# Patient Record
Sex: FEMALE | URBAN - METROPOLITAN AREA
[De-identification: names, ages, dates, MRNs, and addresses within clinical notes are randomized per-mention and may not be internally consistent; named-entity substitution may affect disease eponyms.]

---

## 2018-11-13 ENCOUNTER — APPOINTMENT (OUTPATIENT)
Dept: GENERAL RADIOLOGY | Facility: CLINIC | Age: 63
End: 2018-11-13
Attending: EMERGENCY MEDICINE
Payer: MEDICARE

## 2018-11-13 ENCOUNTER — HOSPITAL ENCOUNTER (OUTPATIENT)
Facility: CLINIC | Age: 63
Setting detail: OBSERVATION
Discharge: HOME | End: 2018-11-15
Attending: EMERGENCY MEDICINE | Admitting: INTERNAL MEDICINE
Payer: MEDICARE

## 2018-11-13 DIAGNOSIS — F32.9 MAJOR DEPRESSIVE DISORDER, REMISSION STATUS UNSPECIFIED, UNSPECIFIED WHETHER RECURRENT: Primary | ICD-10-CM

## 2018-11-13 DIAGNOSIS — K21.9 GASTROESOPHAGEAL REFLUX DISEASE, ESOPHAGITIS PRESENCE NOT SPECIFIED: ICD-10-CM

## 2018-11-13 DIAGNOSIS — Z79.4 TYPE 2 DIABETES MELLITUS WITH COMPLICATION, WITH LONG-TERM CURRENT USE OF INSULIN (H): ICD-10-CM

## 2018-11-13 DIAGNOSIS — R41.89 COGNITIVE IMPAIRMENT: ICD-10-CM

## 2018-11-13 DIAGNOSIS — R73.9 HYPERGLYCEMIA: ICD-10-CM

## 2018-11-13 DIAGNOSIS — E11.8 TYPE 2 DIABETES MELLITUS WITH COMPLICATION, WITH LONG-TERM CURRENT USE OF INSULIN (H): ICD-10-CM

## 2018-11-13 LAB
ALBUMIN SERPL-MCNC: 2.8 G/DL (ref 3.4–5)
ALP SERPL-CCNC: 154 U/L (ref 40–150)
ALT SERPL W P-5'-P-CCNC: 36 U/L (ref 0–50)
ANION GAP SERPL CALCULATED.3IONS-SCNC: 10 MMOL/L (ref 3–14)
AST SERPL W P-5'-P-CCNC: 9 U/L (ref 0–45)
BASOPHILS # BLD AUTO: 0 10E9/L (ref 0–0.2)
BASOPHILS NFR BLD AUTO: 0.2 %
BILIRUB SERPL-MCNC: 0.5 MG/DL (ref 0.2–1.3)
BUN SERPL-MCNC: 42 MG/DL (ref 7–30)
CALCIUM SERPL-MCNC: 10.2 MG/DL (ref 8.5–10.1)
CHLORIDE SERPL-SCNC: 91 MMOL/L (ref 94–109)
CO2 SERPL-SCNC: 27 MMOL/L (ref 20–32)
CREAT SERPL-MCNC: 1.04 MG/DL (ref 0.52–1.04)
DIFFERENTIAL METHOD BLD: ABNORMAL
EOSINOPHIL # BLD AUTO: 0.2 10E9/L (ref 0–0.7)
EOSINOPHIL NFR BLD AUTO: 1.9 %
ERYTHROCYTE [DISTWIDTH] IN BLOOD BY AUTOMATED COUNT: 12.6 % (ref 10–15)
GFR SERPL CREATININE-BSD FRML MDRD: 53 ML/MIN/1.7M2
GLUCOSE BLDC GLUCOMTR-MCNC: 488 MG/DL (ref 70–99)
GLUCOSE BLDC GLUCOMTR-MCNC: >600 MG/DL (ref 70–99)
GLUCOSE SERPL-MCNC: 715 MG/DL (ref 70–99)
HBA1C MFR BLD: 8.1 % (ref 0–5.6)
HCT VFR BLD AUTO: 37.4 % (ref 35–47)
HGB BLD-MCNC: 12.1 G/DL (ref 11.7–15.7)
IMM GRANULOCYTES # BLD: 0.1 10E9/L (ref 0–0.4)
IMM GRANULOCYTES NFR BLD: 0.5 %
INTERPRETATION ECG - MUSE: NORMAL
INTERPRETATION ECG - MUSE: NORMAL
LIPASE SERPL-CCNC: 57 U/L (ref 73–393)
LYMPHOCYTES # BLD AUTO: 2 10E9/L (ref 0.8–5.3)
LYMPHOCYTES NFR BLD AUTO: 17.7 %
MCH RBC QN AUTO: 27.9 PG (ref 26.5–33)
MCHC RBC AUTO-ENTMCNC: 32.4 G/DL (ref 31.5–36.5)
MCV RBC AUTO: 86 FL (ref 78–100)
MONOCYTES # BLD AUTO: 0.7 10E9/L (ref 0–1.3)
MONOCYTES NFR BLD AUTO: 6 %
NEUTROPHILS # BLD AUTO: 8.2 10E9/L (ref 1.6–8.3)
NEUTROPHILS NFR BLD AUTO: 73.7 %
NRBC # BLD AUTO: 0 10*3/UL
NRBC BLD AUTO-RTO: 0 /100
PLATELET # BLD AUTO: 280 10E9/L (ref 150–450)
POTASSIUM SERPL-SCNC: 4.6 MMOL/L (ref 3.4–5.3)
PROT SERPL-MCNC: 7.8 G/DL (ref 6.8–8.8)
RBC # BLD AUTO: 4.34 10E12/L (ref 3.8–5.2)
SODIUM SERPL-SCNC: 128 MMOL/L (ref 133–144)
TROPONIN I BLD-MCNC: 0.01 UG/L (ref 0–0.08)
TROPONIN I SERPL-MCNC: <0.015 UG/L (ref 0–0.04)
WBC # BLD AUTO: 11.1 10E9/L (ref 4–11)

## 2018-11-13 PROCEDURE — 25000131 ZZH RX MED GY IP 250 OP 636 PS 637: Mod: GY | Performed by: INTERNAL MEDICINE

## 2018-11-13 PROCEDURE — 96372 THER/PROPH/DIAG INJ SC/IM: CPT | Mod: XS

## 2018-11-13 PROCEDURE — 25000125 ZZHC RX 250: Performed by: EMERGENCY MEDICINE

## 2018-11-13 PROCEDURE — 93005 ELECTROCARDIOGRAM TRACING: CPT | Mod: 76

## 2018-11-13 PROCEDURE — 25000128 H RX IP 250 OP 636: Performed by: INTERNAL MEDICINE

## 2018-11-13 PROCEDURE — 84484 ASSAY OF TROPONIN QUANT: CPT | Performed by: EMERGENCY MEDICINE

## 2018-11-13 PROCEDURE — 25000132 ZZH RX MED GY IP 250 OP 250 PS 637: Performed by: EMERGENCY MEDICINE

## 2018-11-13 PROCEDURE — 25000131 ZZH RX MED GY IP 250 OP 636 PS 637: Mod: GY | Performed by: EMERGENCY MEDICINE

## 2018-11-13 PROCEDURE — 85025 COMPLETE CBC W/AUTO DIFF WBC: CPT | Performed by: EMERGENCY MEDICINE

## 2018-11-13 PROCEDURE — 84484 ASSAY OF TROPONIN QUANT: CPT

## 2018-11-13 PROCEDURE — 36415 COLL VENOUS BLD VENIPUNCTURE: CPT | Performed by: INTERNAL MEDICINE

## 2018-11-13 PROCEDURE — 96374 THER/PROPH/DIAG INJ IV PUSH: CPT

## 2018-11-13 PROCEDURE — 25000132 ZZH RX MED GY IP 250 OP 250 PS 637: Mod: GY | Performed by: INTERNAL MEDICINE

## 2018-11-13 PROCEDURE — 84484 ASSAY OF TROPONIN QUANT: CPT | Performed by: INTERNAL MEDICINE

## 2018-11-13 PROCEDURE — 93005 ELECTROCARDIOGRAM TRACING: CPT

## 2018-11-13 PROCEDURE — A9270 NON-COVERED ITEM OR SERVICE: HCPCS | Mod: GY | Performed by: INTERNAL MEDICINE

## 2018-11-13 PROCEDURE — 99220 ZZC INITIAL OBSERVATION CARE,LEVL III: CPT | Performed by: INTERNAL MEDICINE

## 2018-11-13 PROCEDURE — 99285 EMERGENCY DEPT VISIT HI MDM: CPT | Mod: 25

## 2018-11-13 PROCEDURE — 80053 COMPREHEN METABOLIC PANEL: CPT | Performed by: EMERGENCY MEDICINE

## 2018-11-13 PROCEDURE — 83036 HEMOGLOBIN GLYCOSYLATED A1C: CPT | Performed by: INTERNAL MEDICINE

## 2018-11-13 PROCEDURE — 25000128 H RX IP 250 OP 636: Performed by: EMERGENCY MEDICINE

## 2018-11-13 PROCEDURE — 71046 X-RAY EXAM CHEST 2 VIEWS: CPT

## 2018-11-13 PROCEDURE — A9270 NON-COVERED ITEM OR SERVICE: HCPCS | Mod: GY | Performed by: EMERGENCY MEDICINE

## 2018-11-13 PROCEDURE — G0378 HOSPITAL OBSERVATION PER HR: HCPCS

## 2018-11-13 PROCEDURE — 83690 ASSAY OF LIPASE: CPT | Performed by: EMERGENCY MEDICINE

## 2018-11-13 PROCEDURE — 96361 HYDRATE IV INFUSION ADD-ON: CPT

## 2018-11-13 PROCEDURE — 00000146 ZZHCL STATISTIC GLUCOSE BY METER IP

## 2018-11-13 RX ORDER — ONDANSETRON 4 MG/1
4 TABLET, ORALLY DISINTEGRATING ORAL EVERY 6 HOURS PRN
Status: DISCONTINUED | OUTPATIENT
Start: 2018-11-13 | End: 2018-11-15 | Stop reason: HOSPADM

## 2018-11-13 RX ORDER — DEXTROSE MONOHYDRATE 25 G/50ML
25-50 INJECTION, SOLUTION INTRAVENOUS
Status: DISCONTINUED | OUTPATIENT
Start: 2018-11-13 | End: 2018-11-14

## 2018-11-13 RX ORDER — AMOXICILLIN 250 MG
2 CAPSULE ORAL 2 TIMES DAILY PRN
Status: DISCONTINUED | OUTPATIENT
Start: 2018-11-13 | End: 2018-11-15 | Stop reason: HOSPADM

## 2018-11-13 RX ORDER — ACETAMINOPHEN 325 MG/1
650 TABLET ORAL EVERY 4 HOURS PRN
Status: DISCONTINUED | OUTPATIENT
Start: 2018-11-13 | End: 2018-11-15 | Stop reason: HOSPADM

## 2018-11-13 RX ORDER — ONDANSETRON 2 MG/ML
4 INJECTION INTRAMUSCULAR; INTRAVENOUS EVERY 6 HOURS PRN
Status: DISCONTINUED | OUTPATIENT
Start: 2018-11-13 | End: 2018-11-15 | Stop reason: HOSPADM

## 2018-11-13 RX ORDER — NALOXONE HYDROCHLORIDE 0.4 MG/ML
.1-.4 INJECTION, SOLUTION INTRAMUSCULAR; INTRAVENOUS; SUBCUTANEOUS
Status: DISCONTINUED | OUTPATIENT
Start: 2018-11-13 | End: 2018-11-15 | Stop reason: HOSPADM

## 2018-11-13 RX ORDER — ONDANSETRON 2 MG/ML
4 INJECTION INTRAMUSCULAR; INTRAVENOUS EVERY 30 MIN PRN
Status: DISCONTINUED | OUTPATIENT
Start: 2018-11-13 | End: 2018-11-13

## 2018-11-13 RX ORDER — METOPROLOL SUCCINATE 100 MG/1
100 TABLET, EXTENDED RELEASE ORAL DAILY
Status: DISCONTINUED | OUTPATIENT
Start: 2018-11-13 | End: 2018-11-15 | Stop reason: HOSPADM

## 2018-11-13 RX ORDER — NICOTINE POLACRILEX 4 MG
15-30 LOZENGE BUCCAL
Status: DISCONTINUED | OUTPATIENT
Start: 2018-11-13 | End: 2018-11-14

## 2018-11-13 RX ORDER — NITROGLYCERIN 0.4 MG/1
0.4 TABLET SUBLINGUAL ONCE
Status: COMPLETED | OUTPATIENT
Start: 2018-11-13 | End: 2018-11-13

## 2018-11-13 RX ORDER — AMOXICILLIN 250 MG
1 CAPSULE ORAL 2 TIMES DAILY PRN
Status: DISCONTINUED | OUTPATIENT
Start: 2018-11-13 | End: 2018-11-15 | Stop reason: HOSPADM

## 2018-11-13 RX ORDER — SODIUM CHLORIDE 9 MG/ML
INJECTION, SOLUTION INTRAVENOUS CONTINUOUS
Status: DISCONTINUED | OUTPATIENT
Start: 2018-11-13 | End: 2018-11-14

## 2018-11-13 RX ORDER — ACETAMINOPHEN 650 MG/1
650 SUPPOSITORY RECTAL EVERY 4 HOURS PRN
Status: DISCONTINUED | OUTPATIENT
Start: 2018-11-13 | End: 2018-11-15 | Stop reason: HOSPADM

## 2018-11-13 RX ADMIN — SODIUM CHLORIDE: 9 INJECTION, SOLUTION INTRAVENOUS at 16:29

## 2018-11-13 RX ADMIN — ACETAMINOPHEN 650 MG: 325 TABLET, FILM COATED ORAL at 20:15

## 2018-11-13 RX ADMIN — NITROGLYCERIN 0.4 MG: 0.4 TABLET SUBLINGUAL at 14:41

## 2018-11-13 RX ADMIN — ONDANSETRON 4 MG: 2 INJECTION INTRAMUSCULAR; INTRAVENOUS at 14:33

## 2018-11-13 RX ADMIN — INSULIN GLARGINE 40 UNITS: 100 INJECTION, SOLUTION SUBCUTANEOUS at 15:49

## 2018-11-13 RX ADMIN — INSULIN ASPART 7 UNITS: 100 INJECTION, SOLUTION INTRAVENOUS; SUBCUTANEOUS at 17:25

## 2018-11-13 RX ADMIN — SODIUM CHLORIDE 1000 ML: 9 INJECTION, SOLUTION INTRAVENOUS at 15:07

## 2018-11-13 RX ADMIN — METOPROLOL SUCCINATE 100 MG: 100 TABLET, EXTENDED RELEASE ORAL at 17:09

## 2018-11-13 RX ADMIN — LIDOCAINE HYDROCHLORIDE 30 ML: 20 SOLUTION ORAL; TOPICAL at 14:34

## 2018-11-13 NOTE — PROGRESS NOTES
RECEIVING UNIT ED HANDOFF REVIEW    ED Nurse Handoff Report was reviewed by: Komal Gerber on November 13, 2018 at 4:21 PM

## 2018-11-13 NOTE — IP AVS SNAPSHOT
MRN:[id number]                      After Visit Summary   11/13/2018    Kadi Leung    MRN: [id number]           Thank you!     Thank you for choosing [facility] for your care. Our goal is always to provide you with excellent care. Hearing back from our patients is one way we can continue to improve our services. Please take a few minutes to complete the written survey that you may receive in the mail after you visit with us. Thank you!        Patient Information     Date Of Birth          1955        About your hospital stay     You were admitted on:  November 13, 2018 You last received care in the:  [facility] Observation Unit    You were discharged on:  November 15, 2018        Reason for your hospital stay       You were here for evaluation of high blood sugars and confusion                  Who to Call     For medical emergencies, please call 911.  For non-urgent questions about your medical care, please call your primary care provider or clinic, [phone]          Attending Provider     Provider Specialty    Urvashi Ibarra MD Emergency Medicine    Mariscal, Gagandeep Bennett MD Internal Medicine       Primary Care Provider Office Phone # Fax #    [facility] [phone]      After Care Instructions     Activity       Your activity upon discharge: activity as tolerated            Diet       Follow this diet upon discharge: Orders Placed This Encounter      Moderate Consistent CHO Diet            Discharge Instructions       We have made some changes in your medications. Because you have been off of these for several weeks we need to adjust some of your doses.   tonight (11/15) when you get home do not take any of your evening pills (nortryptaline or metformin).  A nurse will be out to your home tomorrow morning to help change out the pills in your pillbox and make sure all your new doses are correct. DO NOT TAKE your pills tomorrow morning 11/16 until  the nurse comes out and helps. You can take your insulin as you normally do.     Changes:  Reduce metformin to 1000mg in the morning and 500mg in the evening. You will do this for 2 weeks and then resume the dose you have previously been on (1500mg in the morning, 100mg with dinner)    Reduce your Nortryptyline that you take at bedtime to 10mg (it was 50mg before). Ask your psychiatry when to increase your dose back to normal when you see them in clinic.   Reduce you Lexapro to 10mg daily; talk with your psychiatrist on 11/20 about when you should increase this.     Make sure to continue taking your insulin. Reduce your mealtime insulin to 5 units instead of 10 units with meals until we are sure you are eating well regularly.  Check your blood sugars three times daily before meals and at bedtime.                  Follow-up Appointments     Follow-up and recommended labs and tests        Follow up Appointment Dr Galicia (Primary Care Doctor)  Monday Nov 19.  2:40 pm  [address], [place]  [phone]            Follow-up and recommended labs and tests        Follow up with your Psychiatrist on 11/20 as scheduled.   Discuss whether to go back to normal doses of Nortriptyline and Lexapro    You should follow up with your primary care provider (Dr Galicia) within 7 days for hospital follow up.   Appointment has been scheduled.  Discuss issues with swallowing and acid reflux symptoms and possible GI referral.   Discuss current metformin  and insulin dose and plan to increase.                  Additional Services     Home care nursing referral       RN skilled nursing visit. RN to assess hydration, nutrition and bowel status, home safety and assist with blood sugar monitoring and ensure patient is taking correct medications in light of recent changes.    Your provider has ordered home care nursing services.   You have been discharged with services from [facility]. They will call you to set up initial  "appointment. If you have questions or if you have not been contacted within 2 days of your discharge please call [phone] .                  Pending Results     No orders found from 2018 to 2018.            Statement of Approval     Ordered          11/15/18 9422  I have reviewed and agree with all the recommendations and orders detailed in this document.  EFFECTIVE NOW     Approved and electronically signed by:  Kraig Dickey MD             Admission Information     Date & Time Provider Department Dept. Phone    2018 Gagandeep Mariscal MD [facility] [phone]      Your Vitals Were     Blood Pressure Pulse Temperature Respirations Height Weight    140/59 (BP Location: Right arm) 71 97.9  F (36.6  C) (Oral) 18 1.422 m (4' 8\") 83.8 kg (184 lb 11.2 oz)    Pulse Oximetry BMI (Body Mass Index)                96% 41.41 kg/m2          [employer] Information     [employer] lets you send messages to your doctor, view your test results, renew your prescriptions, schedule appointments and more. To sign up, go to [email or web address] . Click on \"Log in\" on the left side of the screen, which will take you to the Welcome page. Then click on \"Sign up Now\" on the right side of the page.     You will be asked to enter the access code listed below, as well as some personal information. Please follow the directions to create your username and password.     Your access code is: [email or web address]  Expires: 2019 10:34 AM     Your access code will  in 90 days. If you need help or a new code, please call your [place] clinic or [phone].        Care EveryWhere ID     This is your Care EveryWhere ID. This could be used by other organizations to access your [place] medical records  [id number]        Equal Access to Services     [facility] ERICKA AH: Charmaine Grace, wacollette montoya, qaybta kaalmaelysia clifton, meagan green. So [facility] " [phone].    ATENCIÓN: Si ken johns, tiene a jarrett disposición servicios gratuitos de asistencia lingüística. Dana vanegas [phone].    We comply with applicable federal civil rights laws and [place] laws. We do not discriminate on the basis of race, color, national origin, age, disability, sex, sexual orientation, or gender identity.               Review of your medicines      START taking        Dose / Directions    omeprazole 40 MG capsule   Commonly known as:  priLOSEC   Used for:  Gastroesophageal reflux disease, esophagitis presence not specified        Dose:  40 mg   Start taking on:  11/16/2018   Take 1 capsule (40 mg) by mouth daily   Quantity:  30 capsule   Refills:  0         CONTINUE these medicines which may have CHANGED, or have new prescriptions. If we are uncertain of the size of tablets/capsules you have at home, strength may be listed as something that might have changed.        Dose / Directions    escitalopram 10 MG tablet   Commonly known as:  LEXAPRO   This may have changed:    - medication strength  - how much to take        Dose:  10 mg   Take 1 tablet (10 mg) by mouth daily   Quantity:  30 tablet   Refills:  0       * metFORMIN 1000 MG tablet   Commonly known as:  GLUCOPHAGE   This may have changed:  how much to take        Dose:  1000 mg   Take 1 tablet (1,000 mg) by mouth daily (with breakfast)   Quantity:  60 tablet   Refills:  0       * metFORMIN 500 MG tablet   Commonly known as:  GLUCOPHAGE   This may have changed:    - medication strength  - how much to take   Used for:  Type 2 diabetes mellitus with complication, with long-term current use of insulin (H)        Dose:  500 mg   Take 1 tablet (500 mg) by mouth daily (with dinner)   Quantity:  14 tablet   Refills:  0       nortriptyline 10 MG capsule   Commonly known as:  PAMELOR   This may have changed:    - medication strength  - how much to take   Used for:  Major depressive disorder, remission status unspecified, unspecified  whether recurrent        Dose:  10 mg   Take 1 capsule (10 mg) by mouth At Bedtime   Quantity:  30 capsule   Refills:  0       NovoLOG FLEXPEN 100 UNIT/ML injection   This may have changed:  how much to take   Used for:  Type 2 diabetes mellitus with complication, with long-term current use of insulin (H)   Generic drug:  insulin aspart        Dose:  5 Units   Inject 5 Units Subcutaneous 3 times daily (with meals)   Refills:  0       * Notice:  This list has 2 medication(s) that are the same as other medications prescribed for you. Read the directions carefully, and ask your doctor or other care provider to review them with you.      CONTINUE these medicines which have NOT CHANGED        Dose / Directions    aspirin 81 MG EC tablet        Dose:  81 mg   Take 81 mg by mouth daily   Refills:  0       atorvastatin 40 MG tablet   Commonly known as:  LIPITOR        Dose:  40 mg   Take 40 mg by mouth daily   Refills:  0       BASAGLAR 100 UNIT/ML injection        Dose:  54 Units   Inject 54 Units Subcutaneous daily   Refills:  0       buPROPion 150 MG 24 hr tablet   Commonly known as:  WELLBUTRIN XL        Dose:  150 mg   Take 150 mg by mouth every morning   Refills:  0       cholecalciferol 5000 units (125 mcg) Caps capsule   Commonly known as:  vitamin D3        Dose:  5000 Units   Take 5,000 Units by mouth daily   Refills:  0       dulaglutide 0.75 MG/0.5ML pen   Commonly known as:  TRULICITY        Dose:  0.75 mg   Inject 0.75 mg Subcutaneous every 7 days sundays   Refills:  0       ferrous sulfate 325 (65 Fe) MG tablet   Commonly known as:  IRON        Dose:  325 mg   Take 325 mg by mouth 2 times daily   Refills:  0       hydrochlorothiazide 25 MG tablet   Commonly known as:  HYDRODIURIL        Dose:  25 mg   Take 25 mg by mouth daily   Refills:  0       lisinopril 40 MG tablet   Commonly known as:  PRINIVIL/ZESTRIL        Dose:  40 mg   Take 40 mg by mouth daily   Refills:  0       metoprolol succinate 100 MG 24 hr  tablet   Commonly known as:  TOPROL-XL        Dose:  100 mg   Take 100 mg by mouth daily   Refills:  0            Where to get your medicines      These medications were sent to [facility] [place], [place] - [address]  [address], [place] [address]     Phone:  [phone]     escitalopram 10 MG tablet    metFORMIN 500 MG tablet    nortriptyline 10 MG capsule    omeprazole 40 MG capsule                Protect others around you: Learn how to safely use, store and throw away your medicines at www.disposemymeds.org.             Medication List: This is a list of all your medications and when to take them. Check marks below indicate your daily home schedule. Keep this list as a reference.      Medications           Morning Afternoon Evening Bedtime As Needed    aspirin 81 MG EC tablet   Take 81 mg by mouth daily   Last time this was given:  81 mg on 11/15/2018  7:51 AM                                atorvastatin 40 MG tablet   Commonly known as:  LIPITOR   Take 40 mg by mouth daily                                BASAGLAR 100 UNIT/ML injection   Inject 54 Units Subcutaneous daily   Last time this was given:  54 Units on 11/15/2018  7:52 AM                                buPROPion 150 MG 24 hr tablet   Commonly known as:  WELLBUTRIN XL   Take 150 mg by mouth every morning                                cholecalciferol 5000 units (125 mcg) Caps capsule   Commonly known as:  vitamin D3   Take 5,000 Units by mouth daily                                dulaglutide 0.75 MG/0.5ML pen   Commonly known as:  TRULICITY   Inject 0.75 mg Subcutaneous every 7 days sundays                                escitalopram 10 MG tablet   Commonly known as:  LEXAPRO   Take 1 tablet (10 mg) by mouth daily   Last time this was given:  10 mg on 11/15/2018 10:27 AM                                ferrous sulfate 325 (65 Fe) MG tablet   Commonly known as:  IRON   Take 325 mg by mouth 2 times daily                                 hydrochlorothiazide 25 MG tablet   Commonly known as:  HYDRODIURIL   Take 25 mg by mouth daily   Last time this was given:  25 mg on 11/15/2018  7:52 AM                                lisinopril 40 MG tablet   Commonly known as:  PRINIVIL/ZESTRIL   Take 40 mg by mouth daily   Last time this was given:  40 mg on 11/15/2018  7:51 AM                                * metFORMIN 1000 MG tablet   Commonly known as:  GLUCOPHAGE   Take 1 tablet (1,000 mg) by mouth daily (with breakfast)   Last time this was given:  1,500 mg on 11/15/2018  7:52 AM                                * metFORMIN 500 MG tablet   Commonly known as:  GLUCOPHAGE   Take 1 tablet (500 mg) by mouth daily (with dinner)   Last time this was given:  1,500 mg on 11/15/2018  7:52 AM                                metoprolol succinate 100 MG 24 hr tablet   Commonly known as:  TOPROL-XL   Take 100 mg by mouth daily   Last time this was given:  100 mg on 11/15/2018  7:51 AM                                nortriptyline 10 MG capsule   Commonly known as:  PAMELOR   Take 1 capsule (10 mg) by mouth At Bedtime                                NovoLOG FLEXPEN 100 UNIT/ML injection   Inject 5 Units Subcutaneous 3 times daily (with meals)   Last time this was given:  5 Units on 11/15/2018  5:29 PM   Generic drug:  insulin aspart                                omeprazole 40 MG capsule   Commonly known as:  priLOSEC   Take 1 capsule (40 mg) by mouth daily   Start taking on:  11/16/2018   Last time this was given:  40 mg on 11/15/2018  7:52 AM                                * Notice:  This list has 2 medication(s) that are the same as other medications prescribed for you. Read the directions carefully, and ask your doctor or other care provider to review them with you.

## 2018-11-13 NOTE — PROGRESS NOTES
MD Notification    Notified Person: MD    Notified Person Name: Dr. Mariscal     Notification Date/Time: 11/13/18 at 1733    Notification Interaction:  Phone     Purpose of Notification: , given 7 units Novolog. Pt is eating. Any other recommendations?     Orders Received: No new order     Comments: Continue IVF, current Insulin as scheduled and BG Q4H. Will continue to observe pt

## 2018-11-13 NOTE — H&P
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  She states she did not take her insulin because she had some bad fish and had nausea/vomiting and diarrhea.  - lantus 40 units in ED, we'll give 30 units in the morning.  -Repeat hemoglobin A1c is 8.1%  - doesn't appear to have HHS  - IV NS  - sliding scale insulin, gradually will bring down sugar overnight  - CHO diet  - hold metformin, trulicity  - consider evaluation for safety to return home    Chest pain  Vague and difficult historian. Told ED that she had chest pain. However, on my questioning states she has back pain but no chest pain  - trop negative  - concern for  ST elevation in II, III, AVF; however on careful review appears ok (d/wDrTeri Ibarra in ED)  - telemetry  - serial troponins  - no heparin right now    Hypertension  Outpatient on lisinopril, toprol XL, hydrochlorothiazide  - hold lisinopril, hydrochlorothiazide for now  - continue metoprolol    HLD  Hold statin    Depression with suicidal ideation  Reported h/o psychosis (CARE everywhere)  - Psych consult for ? Suicidal ideation  - meds (abilify, escitalopram, bupropion, nortrypt) once med rec done    Pain plan: # Pain Assessment:  Current Pain Score 11/13/2018   Pain score (0-10) 9   - Kadi is experiencing pain due to back pain. Pain management was discussed and the plan was created in a collaborative fashion.  Kadi's response to the current recommendations: engaged  - Please see the plan for pain management as documented above    DVT Prophylaxis:  "Low Risk/Ambulatory with no VTE prophylaxis indicated  Code Status: Full Code    Disposition: Expected discharge in 1-2 days pending improvement of blood sugar and safe home discharge    Gagandeep Mariscal MD  [phone] (P)  Text Page     Primary Care Physician   Critical access hospital    Chief Complaint   hyperglycemia    History is obtained from the patient and medical records    History of Present Illness   Kadi Leung is a 63 year old female who presents with hyperglycemia.  She has a history of diabetes type 2 with neuropathy and retinopathy.  She also has depression and reported history of psychosis.  She states she \"ate spoiled fish\" approximately three weeks ago.  She states that she had lost her appetite because of this.  She had nausea vomiting and diarrhea.  Because of this she states she stopped taking her insulin and meds.  She was not checking her blood sugars.  She was seen in clinic and reported this to her providers.  Blood sugar was checked and was higher than the machine was able to read.  Emergency department she denies shortness of breath or abdominal pain.  She denies ongoing nausea vomiting or diarrhea.  She is complaining of back pain from her neck down to her tailbone    In the emergency department she was found to have a blood pressure 150/78, normal heart rate and respiratory rate.  She is afebrile.  Labs were remarkable for sodium 128 and a creatinine of 1.0, which is her baseline.  Troponin LFTs were negative.  White blood cell count was mildly elevated at 11.1.  An EKG was done and there is some question of very mild ST elevation in II, III, and F aVF.  However, closer analysis, it appears to be baseline without ST changes.    Past Medical History    I have reviewed this patient's medical history and updated it with pertinent information if needed.   Past Medical History:   Diagnosis Date     Diabetes (H)        Past Surgical History   I have reviewed this patient's surgical " history and updated it with pertinent information if needed.  History reviewed. No pertinent surgical history.    Prior to Admission Medications   None     Allergies   No Known Allergies    Social History   I have reviewed this patient's social history and updated it with pertinent information if needed. Kadi Leung  reports that she has never smoked. She has never used smokeless tobacco.    Family History   I have reviewed this patient's family history and updated it with pertinent information if needed.   Denies family history but poor historian    Review of Systems   The 10 point Review of Systems is negative other than noted in the HPI or here. She is a poor historian.    Physical Exam   Temp: 98.3  F (36.8  C) Temp src: Oral BP: 150/78 Pulse: 81   Resp: 20 SpO2: 94 %      Vital Signs with Ranges  180 lbs 0 oz    Constitutional: alert, oriented and in no acute distress  Eyes: EOMI, PERRL  HEENT: MM dry  Respiratory: CTA B without w/c  Cardiovascular: RRR. No edema  GI: soft, nontender, nondistended, no HSM  Lymph/Hematologic: no cervical LAD  Genitourinary: deferred  Skin: no rashes or lesions grossly  Musculoskeletal: no deformities or arthritis  Neurologic: CN II-XII, BRONSON, sensation grossly intact  Psychiatric: mood and affect appear ok    Data   Data reviewed today:  I personally reviewed the EKG tracing showing ST changes as discussed in HPI and Asessment/ plan.    Recent Labs  Lab 11/13/18  1424 11/13/18  1408   WBC  --  11.1*   HGB  --  12.1   MCV  --  86   PLT  --  280   NA  --  128*   POTASSIUM  --  4.6   CHLORIDE  --  91*   CO2  --  27   BUN  --  42*   CR  --  1.04   ANIONGAP  --  10   WESLEY  --  10.2*   GLC  --  715*   ALBUMIN  --  2.8*   PROTTOTAL  --  7.8   BILITOTAL  --  0.5   ALKPHOS  --  154*   ALT  --  36   AST  --  9   LIPASE  --  57*   TROPI  --  <0.015   TROPONIN 0.01  --        Recent Results (from the past 24 hour(s))   XR Chest 2 Views    Narrative    XR CHEST 2 VW   11/13/2018 3:01 PM      HISTORY: chest pain;     COMPARISON: None.    FINDINGS: Patient is taking a shallow inspiration. There is a small  amount of platelike atelectasis at the left lung base.  Lungs are  otherwise clear. The pulmonary vasculature is normal.  The bones and  soft tissues are unremarkable.      Impression    IMPRESSION: Platelike atelectasis left lung base. The lungs are  otherwise clear.        IBIS INGRAM MD

## 2018-11-13 NOTE — ED NOTES
Bed: ED01  Expected date:   Expected time:   Means of arrival:   Comments:  [place] High blood sugar 63 female

## 2018-11-13 NOTE — IP AVS SNAPSHOT
[facility] Observation Unit    [address] [place] [id number]    Phone:  [phone]                                       After Visit Summary   11/13/2018    Kaid Leung    MRN: [id number]           After Visit Summary Signature Page     I have received my discharge instructions, and my questions have been answered. I have discussed any challenges I see with this plan with the nurse or doctor.    ..........................................................................................................................................  Patient/Patient Representative Signature      ..........................................................................................................................................  Patient Representative Print Name and Relationship to Patient    ..................................................               ................................................  Date                                   Time    ..........................................................................................................................................  Reviewed by Signature/Title    ...................................................              ..............................................  Date                                               Time          22EPIC Rev 08/18

## 2018-11-13 NOTE — ED PROVIDER NOTES
"  History     Chief Complaint:  Hyperglycemia    HPI   Kadi Leung is a 63 year old female who presents with hyperglycemia. Patient has not taken her medications in 2-3 weeks after she ate a bad meal and started to have vomiting and diarrhea after that. Diarrhea resolved but vomiting continued. Last episode of vomiting yesterday. Has had burning chest pain for 2 weeks straight. No sob. No fever. No SI. Was seeing therapist today who sent her here because of the elevated glucose.     Allergies:  No known drug allergies.     Medications:    The patient is currently on no regular medications.     Past Medical History:    Diabetes    Past Surgical History:    History reviewed. No pertinent past surgical history.     Family History:    History reviewed. No pertinent family history.     Social History:  Marital Status:  Single   Smoking status: No   Alcohol use: No       Review of Systems   All other systems reviewed and are negative.    Physical Exam     Vital signs  Patient Vitals for the past 24 hrs:   BP Temp Temp src Pulse Resp SpO2 Height Weight   11/13/18 1401 150/78 98.3  F (36.8  C) Oral 81 20 94 % 1.422 m (4' 8\") 81.6 kg (180 lb)       Physical Exam  General: Resting comfortably on the gurney  Eyes:  The pupils are equal and round    Conjunctivae and sclerae are normal  ENT:    Moist mucous membranes  Neck:  Normal range of motion  CV:  Regular rate and rhythm    Skin warm and well perfused   Resp:  Lungs are clear    Non-labored    No rales    No wheezing   GI:  Abdomen is soft, there is no rigidity    No distension    No rebound tenderness     No abdominal tenderness  MS:  Normal muscular tone  Skin:  No rash or acute skin lesions noted  Neuro:   Awake, alert.      Speech is normal and fluent.    Face is symmetric.     Moves all extremities equally  Psych: Normal affect.  Appropriate interactions.    Emergency Department Course   ECG (14:04:09):  Rate 81 bpm. ID interval 146. QRS duration 76. QT/QTc " 368/427. P-R-T axes 48 42 49. Normal sinus rhythm. Normal ECG. Interpreted  by Urvashi Ibarra MD*.    ECG (14:30:55):  Rate 84 bpm. AR interval 148. QRS duration 70. QT/QTc 362/427. P-R-T axes 57 45 60. Normal sinus rhythm. Normal ECG. Interpreted at 1440 by Urvashi Ibarra MD    Imaging:  XR Chest 2 Views   Final Result   IMPRESSION: Platelike atelectasis left lung base. The lungs are   otherwise clear.          IBIS INGRAM MD        I communicated the results of the imaging studies with the patient who expressed understanding of these findings.      Laboratory:  Troponin POCT 0.01  CBC: WBC 11.1, otherwise within normal limits  Lipase 57  CMP: , Cl 91, Glucose 715, BUN 42, GFR Estimate 53, Ca 10.2, Albumin 2.8, ALK Phos     Interventions:  1433: Zofran 4mg IV  1434: Lidocaine GI Cocktail  1441: Nitrostat 0.4mg   1507: NS 1L IV Bolus   1549: Lantus 40 units subcutaneous   1709: Metoprolol 100mg  1725: Insulin subcutaneous     Emergency Department Course:  Past medical records, nursing notes, and vitals reviewed.  I performed an exam of the patient and obtained history, as documented above.      EKG and repeat EKG obtained. Findings as reported above.     The patient was sent for a CXR while in the emergency department, findings above.     IV inserted and blood drawn for the above work up to be conducted.     Findings and plan explained to the Patient who consents to admission.     Discussed the patient with Dr. Mariscal, who will admit the patient to an observation bed for further monitoring, evaluation, and treatment.    Impression & Plan      Medical Decision Making:  Kadi Leung is a 63 year old female who presented to the ED with hyperglycemia. Patient with hyperglycemia. Glucose in 700s. No evidence of DKA/HHS. Hyperglycemic because of not taking medications as she was not feeling well. Denies this as suicide attempt. EKG with no acute ischemic changes. Troponin negative. Doubt PE,  dissection. Has no abdominal tenderness on exam. Suspect more gastritis versus ulcer as cause of chest discomfort given symptoms. Given IV fluids. Given how high the glucose is, will admit to hospital. Discussed insulin with hospitalist and will given lantus 40 units now for hyperglycemia.     Diagnosis:    ICD-10-CM    1. Hyperglycemia R73.9 Hemoglobin A1c     Troponin I     Glucose by meter     Glucose by meter     Disposition:  Admitted to observation under the care of Dr. Mariscal.     IJuana, am serving as a scribe at 2:23 PM on 11/13/2018 to document services personally performed by Urvashi Ibarra MD* based on my observations and the provider's statements to me.     EMERGENCY DEPARTMENT       Urvashi Ibarra MD  11/13/18 4418

## 2018-11-13 NOTE — PHARMACY-ADMISSION MEDICATION HISTORY
"Admission medication history interview status for the 11/13/2018  admission is complete. See EPIC admission navigator for prior to admission medications     Medication history source reliability:Moderate    Actions taken by pharmacist (provider contacted, etc):None     Additional medication history information not noted on PTA med list :None    Medication reconciliation/reorder completed by provider prior to medication history? No    Time spent in this activity: 15\"    Prior to Admission medications    Not on File         "

## 2018-11-13 NOTE — ED NOTES
"[facility]  ED Nurse Handoff Report    ED Chief complaint: Hyperglycemia (pt was at her MH therapists today and was talking about how she hasnt been taking her meds for the past 3 wks, EMS checked blood sugar and it read as HIGH) and Chest Pain (pt states that she feels like a burning sensation in her chest, states she hasn't been eating much because of this)      ED Diagnosis:   Final diagnoses:   Hyperglycemia       Code Status: TBD at admission    Allergies: No Known Allergies    Activity level - Baseline/Home:  Independent    Activity Level - Current:   Independent     Needed?: No    Isolation: No  Infection: Not Applicable  Bariatric?: No    Vital Signs:   Vitals:    11/13/18 1401   BP: 150/78   Pulse: 81   Resp: 20   Temp: 98.3  F (36.8  C)   TempSrc: Oral   SpO2: 94%   Weight: 81.6 kg (180 lb)   Height: 1.422 m (4' 8\")       Cardiac Rhythm: ,        Pain level: 0-10 Pain Scale: 9    Is this patient confused?: No   [place] - Suicide Severity Rating Scale Completed?  Yes  If yes, what color did the patient score?  White    Patient Report: arrived via EMS with complaint of hyperglycemia. Pt was at her MH therapist today and was talking about how she hasnt been taking her meds for the past 3 wks, EMS checked blood sugar and it read as HIGH. Also has c/o chest pain, states that she feels a burning sensation in her chest, states she hasn't been eating much because of this.  Focused Assessment: A/O x3, reports \"chest burning\", lungs clear, no change after GI cocktail and 1 nitro  Tests Performed: labs, ekg,  Abnormal Results:   Results for orders placed or performed during the hospital encounter of 11/13/18 (from the past 24 hour(s))   EKG 12-lead, tracing only   Result Value Ref Range    Interpretation ECG Click View Image link to view waveform and result    CBC with platelets differential   Result Value Ref Range    WBC 11.1 (H) 4.0 - 11.0 10e9/L    RBC Count 4.34 3.8 - 5.2 10e12/L    " Hemoglobin 12.1 11.7 - 15.7 g/dL    Hematocrit 37.4 35.0 - 47.0 %    MCV 86 78 - 100 fl    MCH 27.9 26.5 - 33.0 pg    MCHC 32.4 31.5 - 36.5 g/dL    RDW 12.6 10.0 - 15.0 %    Platelet Count 280 150 - 450 10e9/L    Diff Method Automated Method     % Neutrophils 73.7 %    % Lymphocytes 17.7 %    % Monocytes 6.0 %    % Eosinophils 1.9 %    % Basophils 0.2 %    % Immature Granulocytes 0.5 %    Nucleated RBCs 0 0 /100    Absolute Neutrophil 8.2 1.6 - 8.3 10e9/L    Absolute Lymphocytes 2.0 0.8 - 5.3 10e9/L    Absolute Monocytes 0.7 0.0 - 1.3 10e9/L    Absolute Eosinophils 0.2 0.0 - 0.7 10e9/L    Absolute Basophils 0.0 0.0 - 0.2 10e9/L    Abs Immature Granulocytes 0.1 0 - 0.4 10e9/L    Absolute Nucleated RBC 0.0    Lipase   Result Value Ref Range    Lipase 57 (L) 73 - 393 U/L   Comprehensive metabolic panel   Result Value Ref Range    Sodium 128 (L) 133 - 144 mmol/L    Potassium 4.6 3.4 - 5.3 mmol/L    Chloride 91 (L) 94 - 109 mmol/L    Carbon Dioxide 27 20 - 32 mmol/L    Anion Gap 10 3 - 14 mmol/L    Glucose 715 (HH) 70 - 99 mg/dL    Urea Nitrogen 42 (H) 7 - 30 mg/dL    Creatinine 1.04 0.52 - 1.04 mg/dL    GFR Estimate 53 (L) >60 mL/min/1.7m2    GFR Estimate If Black 65 >60 mL/min/1.7m2    Calcium 10.2 (H) 8.5 - 10.1 mg/dL    Bilirubin Total 0.5 0.2 - 1.3 mg/dL    Albumin 2.8 (L) 3.4 - 5.0 g/dL    Protein Total 7.8 6.8 - 8.8 g/dL    Alkaline Phosphatase 154 (H) 40 - 150 U/L    ALT 36 0 - 50 U/L    AST 9 0 - 45 U/L   Troponin I   Result Value Ref Range    Troponin I ES <0.015 0.000 - 0.045 ug/L   Troponin POCT   Result Value Ref Range    Troponin I 0.01 0.00 - 0.08 ug/L   EKG 12-lead, tracing only   Result Value Ref Range    Interpretation ECG Click View Image link to view waveform and result    XR Chest 2 Views    Narrative    XR CHEST 2 VW   11/13/2018 3:01 PM     HISTORY: chest pain;     COMPARISON: None.    FINDINGS: Patient is taking a shallow inspiration. There is a small  amount of platelike atelectasis at the left  lung base.  Lungs are  otherwise clear. The pulmonary vasculature is normal.  The bones and  soft tissues are unremarkable.      Impression    IMPRESSION: Platelike atelectasis left lung base. The lungs are  otherwise clear.        IBIS INGRAM MD     Treatments provided: GI cocktail, NS bolus, nitroglycerin SL, 40 units lantus    Family Comments: none present    OBS brochure/video discussed/provided to patient/family: Yes              Name of person given brochure if not patient:               Relationship to patient:     ED Medications:   Medications   ondansetron (ZOFRAN) injection 4 mg (4 mg Intravenous Given 11/13/18 1433)   nitroGLYcerin (NITROSTAT) sublingual tablet 0.4 mg (0.4 mg Sublingual Given 11/13/18 1441)   lidocaine (viscous) (XYLOCAINE) 2 % 15 mL, alum & mag hydroxide-simethicone (MYLANTA ES/MAALOX  ES) 15 mL GI Cocktail (30 mLs Oral Given 11/13/18 1434)   0.9% sodium chloride BOLUS (1,000 mLs Intravenous New Bag 11/13/18 1507)   insulin glargine (LANTUS) injection 40 Units (40 Units Subcutaneous Given 11/13/18 1549)       Drips infusing?:  Yes    For the majority of the shift this patient was Green.   Interventions performed were na.    Severe Sepsis OR Septic Shock Diagnosis Present: No    To be done/followed up on inpatient unit:      ED NURSE PHONE NUMBER: [phone]

## 2018-11-14 ENCOUNTER — APPOINTMENT (OUTPATIENT)
Dept: OCCUPATIONAL THERAPY | Facility: CLINIC | Age: 63
End: 2018-11-14
Attending: INTERNAL MEDICINE
Payer: MEDICARE

## 2018-11-14 LAB
ANION GAP SERPL CALCULATED.3IONS-SCNC: 6 MMOL/L (ref 3–14)
BUN SERPL-MCNC: 29 MG/DL (ref 7–30)
CALCIUM SERPL-MCNC: 8.7 MG/DL (ref 8.5–10.1)
CHLORIDE SERPL-SCNC: 101 MMOL/L (ref 94–109)
CO2 SERPL-SCNC: 28 MMOL/L (ref 20–32)
CREAT SERPL-MCNC: 0.8 MG/DL (ref 0.52–1.04)
ERYTHROCYTE [DISTWIDTH] IN BLOOD BY AUTOMATED COUNT: 12.6 % (ref 10–15)
GFR SERPL CREATININE-BSD FRML MDRD: 73 ML/MIN/1.7M2
GLUCOSE BLDC GLUCOMTR-MCNC: 166 MG/DL (ref 70–99)
GLUCOSE BLDC GLUCOMTR-MCNC: 197 MG/DL (ref 70–99)
GLUCOSE BLDC GLUCOMTR-MCNC: 244 MG/DL (ref 70–99)
GLUCOSE BLDC GLUCOMTR-MCNC: 288 MG/DL (ref 70–99)
GLUCOSE BLDC GLUCOMTR-MCNC: 316 MG/DL (ref 70–99)
GLUCOSE BLDC GLUCOMTR-MCNC: 360 MG/DL (ref 70–99)
GLUCOSE BLDC GLUCOMTR-MCNC: 360 MG/DL (ref 70–99)
GLUCOSE SERPL-MCNC: 298 MG/DL (ref 70–99)
HCT VFR BLD AUTO: 32.2 % (ref 35–47)
HGB BLD-MCNC: 10.6 G/DL (ref 11.7–15.7)
MCH RBC QN AUTO: 27.7 PG (ref 26.5–33)
MCHC RBC AUTO-ENTMCNC: 32.9 G/DL (ref 31.5–36.5)
MCV RBC AUTO: 84 FL (ref 78–100)
PLATELET # BLD AUTO: 265 10E9/L (ref 150–450)
POTASSIUM SERPL-SCNC: 4.2 MMOL/L (ref 3.4–5.3)
RBC # BLD AUTO: 3.83 10E12/L (ref 3.8–5.2)
SODIUM SERPL-SCNC: 135 MMOL/L (ref 133–144)
WBC # BLD AUTO: 8 10E9/L (ref 4–11)

## 2018-11-14 PROCEDURE — 36415 COLL VENOUS BLD VENIPUNCTURE: CPT | Performed by: INTERNAL MEDICINE

## 2018-11-14 PROCEDURE — 25000128 H RX IP 250 OP 636: Performed by: INTERNAL MEDICINE

## 2018-11-14 PROCEDURE — 25000131 ZZH RX MED GY IP 250 OP 636 PS 637: Mod: GY | Performed by: PHYSICIAN ASSISTANT

## 2018-11-14 PROCEDURE — A9270 NON-COVERED ITEM OR SERVICE: HCPCS | Mod: GY | Performed by: PHYSICIAN ASSISTANT

## 2018-11-14 PROCEDURE — 25000131 ZZH RX MED GY IP 250 OP 636 PS 637: Mod: GY | Performed by: INTERNAL MEDICINE

## 2018-11-14 PROCEDURE — A9270 NON-COVERED ITEM OR SERVICE: HCPCS | Mod: GY | Performed by: INTERNAL MEDICINE

## 2018-11-14 PROCEDURE — 25000132 ZZH RX MED GY IP 250 OP 250 PS 637: Mod: GY | Performed by: PHYSICIAN ASSISTANT

## 2018-11-14 PROCEDURE — 96372 THER/PROPH/DIAG INJ SC/IM: CPT

## 2018-11-14 PROCEDURE — 97166 OT EVAL MOD COMPLEX 45 MIN: CPT | Mod: GO | Performed by: OCCUPATIONAL THERAPIST

## 2018-11-14 PROCEDURE — 00000146 ZZHCL STATISTIC GLUCOSE BY METER IP

## 2018-11-14 PROCEDURE — 25000132 ZZH RX MED GY IP 250 OP 250 PS 637: Mod: GY | Performed by: INTERNAL MEDICINE

## 2018-11-14 PROCEDURE — 99207 ZZC CDG-CODE CATEGORY CHANGED: CPT | Performed by: PHYSICIAN ASSISTANT

## 2018-11-14 PROCEDURE — 40000133 ZZH STATISTIC OT WARD VISIT: Performed by: OCCUPATIONAL THERAPIST

## 2018-11-14 PROCEDURE — 99226 ZZC SUBSEQUENT OBSERVATION CARE,LEVEL III: CPT | Performed by: PHYSICIAN ASSISTANT

## 2018-11-14 PROCEDURE — 80048 BASIC METABOLIC PNL TOTAL CA: CPT | Performed by: INTERNAL MEDICINE

## 2018-11-14 PROCEDURE — G0378 HOSPITAL OBSERVATION PER HR: HCPCS

## 2018-11-14 PROCEDURE — 97127 ZZHC OT THERAPEUTIC INTERVENTION W/FOCUS ON COGNITIVE FUNCTION,EA 15 MIN: CPT | Mod: GO | Performed by: OCCUPATIONAL THERAPIST

## 2018-11-14 PROCEDURE — 85027 COMPLETE CBC AUTOMATED: CPT | Performed by: INTERNAL MEDICINE

## 2018-11-14 PROCEDURE — 40000893 ZZH STATISTIC PT IP EVAL DEFER

## 2018-11-14 RX ORDER — METOPROLOL SUCCINATE 100 MG/1
100 TABLET, EXTENDED RELEASE ORAL DAILY
COMMUNITY

## 2018-11-14 RX ORDER — NICOTINE POLACRILEX 4 MG
15-30 LOZENGE BUCCAL
Status: DISCONTINUED | OUTPATIENT
Start: 2018-11-14 | End: 2018-11-14

## 2018-11-14 RX ORDER — ESCITALOPRAM OXALATE 20 MG/1
20 TABLET ORAL DAILY
Status: ON HOLD | COMMUNITY
End: 2018-11-15

## 2018-11-14 RX ORDER — DEXTROSE MONOHYDRATE 25 G/50ML
25-50 INJECTION, SOLUTION INTRAVENOUS
Status: DISCONTINUED | OUTPATIENT
Start: 2018-11-14 | End: 2018-11-14

## 2018-11-14 RX ORDER — LISINOPRIL 40 MG/1
40 TABLET ORAL DAILY
Status: DISCONTINUED | OUTPATIENT
Start: 2018-11-14 | End: 2018-11-15 | Stop reason: HOSPADM

## 2018-11-14 RX ORDER — HYDROCHLOROTHIAZIDE 25 MG/1
25 TABLET ORAL DAILY
COMMUNITY

## 2018-11-14 RX ORDER — NORTRIPTYLINE HYDROCHLORIDE 50 MG/1
50 CAPSULE ORAL AT BEDTIME
Status: ON HOLD | COMMUNITY
End: 2018-11-15

## 2018-11-14 RX ORDER — ASPIRIN 81 MG/1
81 TABLET ORAL DAILY
Status: DISCONTINUED | OUTPATIENT
Start: 2018-11-14 | End: 2018-11-15 | Stop reason: HOSPADM

## 2018-11-14 RX ORDER — METOPROLOL SUCCINATE 100 MG/1
100 TABLET, EXTENDED RELEASE ORAL DAILY
Status: DISCONTINUED | OUTPATIENT
Start: 2018-11-14 | End: 2018-11-14

## 2018-11-14 RX ORDER — ASPIRIN 81 MG/1
81 TABLET ORAL DAILY
COMMUNITY

## 2018-11-14 RX ORDER — LISINOPRIL 40 MG/1
40 TABLET ORAL DAILY
COMMUNITY

## 2018-11-14 RX ORDER — ATORVASTATIN CALCIUM 40 MG/1
40 TABLET, FILM COATED ORAL DAILY
COMMUNITY

## 2018-11-14 RX ORDER — INSULIN GLARGINE 100 [IU]/ML
54 INJECTION, SOLUTION SUBCUTANEOUS DAILY
COMMUNITY

## 2018-11-14 RX ORDER — NICOTINE POLACRILEX 4 MG
15-30 LOZENGE BUCCAL
Status: DISCONTINUED | OUTPATIENT
Start: 2018-11-14 | End: 2018-11-15 | Stop reason: HOSPADM

## 2018-11-14 RX ORDER — BUPROPION HYDROCHLORIDE 150 MG/1
150 TABLET ORAL EVERY MORNING
COMMUNITY

## 2018-11-14 RX ORDER — DEXTROSE MONOHYDRATE 25 G/50ML
25-50 INJECTION, SOLUTION INTRAVENOUS
Status: DISCONTINUED | OUTPATIENT
Start: 2018-11-14 | End: 2018-11-15 | Stop reason: HOSPADM

## 2018-11-14 RX ORDER — HYDROCHLOROTHIAZIDE 25 MG/1
25 TABLET ORAL DAILY
Status: DISCONTINUED | OUTPATIENT
Start: 2018-11-15 | End: 2018-11-15 | Stop reason: HOSPADM

## 2018-11-14 RX ORDER — FERROUS SULFATE 325(65) MG
325 TABLET ORAL 2 TIMES DAILY
COMMUNITY

## 2018-11-14 RX ADMIN — SODIUM CHLORIDE: 9 INJECTION, SOLUTION INTRAVENOUS at 00:16

## 2018-11-14 RX ADMIN — METFORMIN HYDROCHLORIDE 1500 MG: 500 TABLET, FILM COATED ORAL at 11:55

## 2018-11-14 RX ADMIN — INSULIN ASPART 10 UNITS: 100 INJECTION, SOLUTION INTRAVENOUS; SUBCUTANEOUS at 18:24

## 2018-11-14 RX ADMIN — ASPIRIN 81 MG: 81 TABLET, COATED ORAL at 11:55

## 2018-11-14 RX ADMIN — METFORMIN HYDROCHLORIDE 1000 MG: 500 TABLET, FILM COATED ORAL at 16:35

## 2018-11-14 RX ADMIN — INSULIN ASPART 10 UNITS: 100 INJECTION, SOLUTION INTRAVENOUS; SUBCUTANEOUS at 12:04

## 2018-11-14 RX ADMIN — INSULIN ASPART 4 UNITS: 100 INJECTION, SOLUTION INTRAVENOUS; SUBCUTANEOUS at 12:04

## 2018-11-14 RX ADMIN — OMEPRAZOLE 40 MG: 20 CAPSULE, DELAYED RELEASE ORAL at 16:35

## 2018-11-14 RX ADMIN — SENNOSIDES AND DOCUSATE SODIUM 1 TABLET: 8.6; 5 TABLET ORAL at 14:35

## 2018-11-14 RX ADMIN — INSULIN GLARGINE 30 UNITS: 100 INJECTION, SOLUTION SUBCUTANEOUS at 07:27

## 2018-11-14 RX ADMIN — METOPROLOL SUCCINATE 100 MG: 100 TABLET, EXTENDED RELEASE ORAL at 07:35

## 2018-11-14 RX ADMIN — INSULIN ASPART 4 UNITS: 100 INJECTION, SOLUTION INTRAVENOUS; SUBCUTANEOUS at 07:27

## 2018-11-14 RX ADMIN — LISINOPRIL 40 MG: 40 TABLET ORAL at 11:55

## 2018-11-14 NOTE — PROVIDER NOTIFICATION
MD Notification    Notified Person: PA     Notified Person Name: Zohreh     Notification Date/Time: 11/14/18 10:21am     Notification Interaction: web page     Purpose of Notification: Blood sugar 360, pt drowsy and OT unable to complete eval     Orders Received:    Comments:

## 2018-11-14 NOTE — PLAN OF CARE
Problem: Patient Care Overview  Goal: Plan of Care/Patient Progress Review  PT:  Discharge Planner PT   Patient plan for discharge: Return home  Current status: Orders received, chart reviewed, discussed in rounds. Pt admitted with hyperglycemia. Pt is up independently and has no skilled PT needs at this time.  Barriers to return to prior living situation: None  Recommendations for discharge: Return home  Rationale for recommendations: No skilled PT needs present.       Entered by: Abbey Jon 11/14/2018 9:33 AM

## 2018-11-14 NOTE — PLAN OF CARE
Problem: Patient Care Overview  Goal: Plan of Care/Patient Progress Review  Outcome: No Change  Observation goals PRIOR TO DISCHARGE     Comments: -diagnostic tests and consults completed and resulted : not met, Psych/SW/PT/OT  -vital signs normal or at patient baseline : met  -tolerating oral intake to maintain hydration : met  -returns to baseline functional status : met  -safe disposition plan has been identified :  Not met  Nurse to notify provider when observation goals have been met and patient is ready for discharge.        yes

## 2018-11-14 NOTE — PLAN OF CARE
Observation goals PRIOR TO DISCHARGE     Comments: -diagnostic tests and consults completed and resulted : not met, Psych/SW/PT/OT  -vital signs normal or at patient baseline : met  -tolerating oral intake to maintain hydration : met  -returns to baseline functional status : met  -safe disposition plan has been identified :  Not met  Nurse to notify provider when observation goals have been met and patient is ready for discharge.         A&Ox4, VSS on RA. Forgetful. Denies pain this shift. Diabetic, mod  Carb diet. Last . Up ad brianna. Voiding adequately per pt. IV NS infusing 125/hr. Pt c/o constipation, 1 tab senna given. Plan for OT, SW, and Psych consults. Paged OT to see pt again today. Will continue to monitor.

## 2018-11-14 NOTE — PLAN OF CARE
Problem: Patient Care Overview  Goal: Plan of Care/Patient Progress Review  A&Ox3, forgetful. Hypertensive SBP 170s. Received scheduled dose of PO Metoprolol. Up IND. Mod carb diet. BG >600 at dinner. Received 7 units of Novolog per md order. On call MD notified. Please see note for details. Denies chest pain. Troponins negative x1 Tele NSR in 80s. SW/PT/OT consulted to see. Will continue to monitor.

## 2018-11-14 NOTE — PROGRESS NOTES
11/14/18 1618   Quick Adds   Type of Visit Initial Occupational Therapy Evaluation   Living Environment   Lives With alone   Living Arrangements apartment   Home Accessibility stairs to enter home  (pt vague about how many stairs but says it is a small number)   Number of Stairs to Enter Home 2   Number of Stairs Within Home 6   Stair Railings at Home inside, present on right side   Transportation Available Medicaid transportation  (pt states she uses [employer])   Living Environment Comment pt states she had a cleaning lady but she quit   Self-Care   Dominant Hand right   Usual Activity Tolerance moderate   Current Activity Tolerance fair   Regular Exercise no  (pt states she walks in the summer using a walker)   Equipment Currently Used at Home walker, rolling   Functional Level Prior   Ambulation 0-->independent   Transferring 0-->independent   Toileting 0-->independent   Bathing 0-->independent   Dressing 0-->independent   Eating 0-->independent   Communication 0-->understands/communicates without difficulty   Swallowing 0-->swallows foods/liquids without difficulty   Cognition 0 - no cognition issues reported   Fall history within last six months no   Which of the above functional risks had a recent onset or change? eating  (pt states she has not eaten or taken meds since this illness)   General Information   Onset of Illness/Injury or Date of Surgery - Date 11/13/18   Referring Physician Gagandeep Mariscal   Patient/Family Goals Statement return home   Additional Occupational Profile Info/Pertinent History of Current Problem Pt admitted with hyperglycemia.  PMH includes DM on insulin, retinopathy, neuropathy, HTN, HLD.  Pt also complained of chest pain with admission but no issues found.  Pt also has a history of depression with suicidal ideation.   Precautions/Limitations no known precautions/limitations   General Observations pt laying in bed, willing to participate   Cognitive Status Examination    Orientation orientation to person, place and time   Level of Consciousness alert   Able to Follow Commands success, 1-step commands   Personal Safety (Cognitive) at risk behaviors demonstrated  (pt stated that she had stopped taking her medicine lately)   Memory impaired   Attention No deficits were identified   Cognitive Comment see daily notes for further results   Visual Perception   Visual Perception Wears glasses  (for reading)   Pain Assessment   Patient Currently in Pain No   Range of Motion (ROM)   ROM Quick Adds No deficits were identified   ROM Comment B UEs   Strength   Manual Muscle Testing Quick Adds No deficits were identified   Strength Comments Pt appears to have functional strength   Coordination   Upper Extremity Coordination No deficits were identified   Mobility   Bed Mobility Bed mobility skill: Sit to supine;Bed mobility skill: Supine to sit   Bed Mobility Skill: Sit to Supine   Level of [place]: Sit/Supine independent   Bed Mobility Skill: Supine to Sit   Level of [place]: Supine/Sit independent   Transfer Skill: Sit to Stand   Level of [place]: Sit/Stand independent   Transfer Skill: Toilet Transfer   Level of [place]: Toilet independent   Lower Body Dressing   Level of [place]: Dress Lower Body independent  (pt needs a low enough seat to allow her to reach her feet)   Eating/Self Feeding   Level of [place]: Eating independent   Activities of Daily Living Analysis   Impairments Contributing to Impaired Activities of Daily Living cognition impaired;strength decreased   General Therapy Interventions   Planned Therapy Interventions cognition   Clinical Impression   Criteria for Skilled Therapeutic Interventions Met yes, treatment indicated   OT Diagnosis decreased safety for ADLs at home   Influenced by the following impairments some confusion and memory loss   Assessment of Occupational Performance 1-3 Performance Deficits   Identified Performance Deficits  "decreased independence for doing household chores, may have issues consistently managing meds   Clinical Decision Making (Complexity) Moderate complexity   Therapy Frequency other (see comments)   Predicted Duration of Therapy Intervention (days/wks) eval and one session   Anticipated Discharge Disposition Home with Assist;Home with Home Therapy   Risks and Benefits of Treatment have been explained. Yes   Patient, Family & other staff in agreement with plan of care Yes   [facility] TM \"6 Clicks\"   2016, Trustees of [facility], under license to [employer].  All rights reserved.   6 Clicks Short Forms Daily Activity Inpatient Short Form   [facility]  \"6 Clicks\" Daily Activity Inpatient Short Form   1. Putting on and taking off regular lower body clothing? 3 - A Little   2. Bathing (including washing, rinsing, drying)? 3 - A Little   3. Toileting, which includes using toilet, bedpan or urinal? 4 - None   4. Putting on and taking off regular upper body clothing? 4 - None   5. Taking care of personal grooming such as brushing teeth? 4 - None   6. Eating meals? 4 - None   Daily Activity Raw Score (Score out of 24.Lower scores equate to lower levels of function) 22   Total Evaluation Time   Total Evaluation Time (Minutes) 15     "

## 2018-11-14 NOTE — PLAN OF CARE
Problem: Patient Care Overview  Goal: Plan of Care/Patient Progress Review  Outcome: No Change  Observation goals PRIOR TO DISCHARGE     Comments: -diagnostic tests and consults completed and resulted : not met, Psych/SW/PT/OT  -vital signs normal or at patient baseline : met  -tolerating oral intake to maintain hydration : met  -returns to baseline functional status : met  -safe disposition plan has been identified :  Not met  Nurse to notify provider when observation goals have been met and patient is ready for discharge.     Pt A&Ox4, forgetful.  VSS on RA.  Tele NSR.  Denied pain.  CMS intact.  Up independently.  Voiding adequately.  NS infusing at 125mL/hr.  A1c = 8.1.  BG at 0200 360. Plan for Ot/PT/SW/Psych consult.

## 2018-11-14 NOTE — PROGRESS NOTES
SW: SW attempted to meet with patient per SW consult although patient busy with another discipline. SW to continue to follow and assist with discharge planning.

## 2018-11-14 NOTE — PLAN OF CARE
Problem: Patient Care Overview  Goal: Plan of Care/Patient Progress Review  Outcome: Completed Date Met: 11/14/18  Discharge Planner OT      Pt is a 63 year old female    Patient plan for discharge: Return home  Current status: Initial evaluation complete, limited treatment session started for cognition and cognitive skills.  Pt report was a little vague at times but was mostly consistent with chart notes.  Pt independent for bed mobility and toilet transfers.  Pt needs lower seat but was able to bend to complete LE dressing.  Scored at the mild neurocognitive level as measured by the SLUMS.  Pt also given 6 home safety questions, able to answer 5 of 6 questions correctly.  Also remembered therapist name throughout.  Potential areas of needed support include help for housecleaning and managing meds.  Pt states she has trouble cleaning because she can't bend and had not been taking meds including insulin since she started not feeling well.  Talked to nursing about findings and recommendations.  Barriers to return to prior living situation: Mild cognitive deficits, ongoing diagnosis of depression with suicidal ideation noted in chart.  Recommendations for discharge: Home with occasional checks for medicine management and safety, help for housekeeping.  Rationale for recommendations: Pt demonstrated mild to moderate cognitive deficits during session.  States she has family in town but son is in rehab and others can't help.  Appears to have enough memory skills for a daily routine, may have difficulty with unexpected or unusual situations.   Would benefit from checkins/PCA help for cleaning for ongoing support.       Entered by: Get Bates 11/14/2018 4:43 PM     Occupational Therapy Discharge Summary    Reason for therapy discharge:    All goals and outcomes met, no further needs identified.    Progress towards therapy goal(s). See goals on Care Plan in [facility] electronic health record for goal details.  Goals  met    Therapy recommendation(s):    Recommend check ins for medication and safety and assist at home to manage cleaning chores.

## 2018-11-14 NOTE — PHARMACY-ADMISSION MEDICATION HISTORY
Admission medication history interview status for the 11/13/2018  admission is complete. See EPIC admission navigator for prior to admission medications     Medication history source reliability:Good    Actions taken by pharmacist (provider contacted, etc): [facility] everywhere, interviewed patient, called [facility] mail [facility] to confirm a few doses and last refill dates     Additional medication history information not noted on PTA med list :None    Medication reconciliation/reorder completed by provider prior to medication history? No    Time spent in this activity: 20 min    Prior to Admission medications    Medication Sig Last Dose Taking? Auth Provider   aspirin 81 MG EC tablet Take 81 mg by mouth daily Past Month at Unknown time Yes Unknown, Entered By History   atorvastatin (LIPITOR) 40 MG tablet Take 40 mg by mouth daily Past Month at Unknown time Yes Unknown, Entered By History   BASAGLAR 100 UNIT/ML injection Inject 54 Units Subcutaneous daily Past Month at Unknown time Yes Unknown, Entered By History   buPROPion (WELLBUTRIN XL) 150 MG 24 hr tablet Take 150 mg by mouth every morning Past Month at Unknown time Yes Unknown, Entered By History   cholecalciferol (VITAMIN D3) 5000 units (125 mcg) CAPS capsule Take 5,000 Units by mouth daily Past Month at Unknown time Yes Unknown, Entered By History   dulaglutide (TRULICITY) 0.75 MG/0.5ML pen Inject 0.75 mg Subcutaneous every 7 days sundays Past Month at Unknown time Yes Unknown, Entered By History   escitalopram (LEXAPRO) 20 MG tablet Take 20 mg by mouth daily Past Month at Unknown time Yes Unknown, Entered By History   ferrous sulfate (IRON) 325 (65 Fe) MG tablet Take 325 mg by mouth 2 times daily Past Month at Unknown time Yes Unknown, Entered By History   hydrochlorothiazide (HYDRODIURIL) 25 MG tablet Take 25 mg by mouth daily Past Month at Unknown time Yes Unknown, Entered By History   insulin aspart (NOVOLOG FLEXPEN) 100 UNIT/ML injection Inject 10 Units  Subcutaneous 3 times daily (with meals) Past Month at Unknown time Yes Unknown, Entered By History   lisinopril (PRINIVIL/ZESTRIL) 40 MG tablet Take 40 mg by mouth daily Past Month at Unknown time Yes Unknown, Entered By History   metFORMIN (GLUCOPHAGE) 1000 MG tablet Take 1,500 mg by mouth daily (with breakfast) Past Month at Unknown time Yes Unknown, Entered By History   metFORMIN (GLUCOPHAGE) 1000 MG tablet Take 1,000 mg by mouth daily (with dinner) Past Month at Unknown time Yes Unknown, Entered By History   metoprolol succinate (TOPROL-XL) 100 MG 24 hr tablet Take 100 mg by mouth daily Past Month at Unknown time Yes Unknown, Entered By History   nortriptyline (PAMELOR) 50 MG capsule Take 50 mg by mouth At Bedtime Past Month at Unknown time Yes Unknown, Entered By History

## 2018-11-14 NOTE — PLAN OF CARE
Problem: Patient Care Overview  Goal: Plan of Care/Patient Progress Review  Outcome: No Change  Observation goals PRIOR TO DISCHARGE     Comments: -diagnostic tests and consults completed and resulted : not met  -vital signs normal or at patient baseline : not met  -tolerating oral intake to maintain hydration : met  -returns to baseline functional status : met  -safe disposition plan has been identified : met  Nurse to notify provider when observation goals have been met and patient is ready for discharge.     Pt A&Ox4, forgetful.  VSS on RA, except HTN at times.  SBP in 150s.  Tele NSR.  Lower back pain managed w/Tylenol.  CMS intact.  Up independently.  Voiding adequately.  NS infusing at 125mL/hr.  A1c = 8.1.  Nursing to continue to monitor.

## 2018-11-14 NOTE — PROGRESS NOTES
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     Hyperglycemia in the setting of medication non-compliance  Type 2 diabetes mellitus, insulin dependent, with retinopathy and neuropathy  Presents from therapist office after blood sugar was too high to read. Reports she has not taken her medications in 3 weeks because she had difficulty swallowing. Initial . A1C is 8.1. Per [facility] she is supposed to be on metformin, Trulicity, Novolog 10u tid and lantus daily. Per chart review therapist concerned about patient's ability to care for herself.   --lantus 40 units in ED, 30 units this am. Resume home dose 54 units in the am  -- resume novolog 10 units tid   --holding trulicity  --resume metformin   --psych, PT, OT to evaluate for home safety. Anticipate she will need home care at the very least  --start PPI to help with GERD  --ask SLP to see due to dysphagia     Chest pain. Difficult historian. Reported some chest pain to the ED, now denying any pain.  EKG without acute ischemic changes. Troponin negative x3.   --telemetry    Hypertension. On lisinopril, metoprolol, and hydrochlorothiazide. Hydrochlorothiazide was increased to 1.5 tablets in October per PCP note. Await formal med rec. Unclear whether patient is being compliant with meds.   --continue PTA meds    History of depression with psychosis. Some question of suicidal ideation per admitting provider note.   --psych consult  --resume abilify, escitalopram, bupropion, nortriptyline when verified     HLD. Hold statin due to observation status    DVT Prophylaxis: Ambulate every shift  Code Status: Full  Code    Disposition: Expected discharge 11/15 pending psychiatry consult and safe dispo    This patient was seen and examined with Dr. Dickey who agrees with the above plan.    Zohreh Giron PA-C    Interval History   Patient reports feeling better this afternoon. Having an easier time swallowing. Denies nausea or vomiting. No chest pain or shortness of breath. Denies SI.    -Data reviewed today: I reviewed all new labs and imaging results over the last 24 hours. I personally reviewed no images or EKG's today.    Physical Exam   Temp: 98.6  F (37  C) Temp src: Oral BP: 134/49 Pulse: 67   Resp: 18 SpO2: 94 % O2 Device: None (Room air)    Vitals:    11/13/18 1401 11/13/18 1630 11/14/18 0500   Weight: 81.6 kg (180 lb) 82.2 kg (181 lb 3.2 oz) 83.8 kg (184 lb 11.2 oz)     Vital Signs with Ranges  Temp:  [97.8  F (36.6  C)-98.6  F (37  C)] 98.6  F (37  C)  Pulse:  [67-82] 67  Resp:  [18-20] 18  BP: (133-177)/(41-78) 134/49  SpO2:  [93 %-94 %] 94 %  I/O last 3 completed shifts:  In: 500 [I.V.:500]  Out: -     Constitutional: Alert and oriented x3, slow speech. Appears comfortable. Sleepy but rouses easily to voice and answers questions appropriately.   ENT: normal cephalic, moist mucous membranes  Respiratory: Lungs clear to auscultation bilaterally, no increased work of breathing or wheezing   Cardiovascular: Regular rate and rhythm, no murmur, no significant LE edema, distal pulses +2/4  GI:  active bowel sounds, abdomen soft, non-tender  Skin/Integumen: warm, dry  MSK:  Moves all four extremities  Neuro:   Cranial nerves 2-12 grossly intact. No focal deficits     Medications     sodium chloride 125 mL/hr at 11/14/18 0016       insulin aspart  1-7 Units Subcutaneous TID AC     insulin aspart  1-5 Units Subcutaneous At Bedtime     insulin glargine  30 Units Subcutaneous QAM AC     metoprolol succinate  100 mg Oral Daily       Data     Recent Labs  Lab 11/14/18  0630 11/13/18  2158 11/13/18  1802 11/13/18  1423  11/13/18  1408   WBC 8.0  --   --   --  11.1*   HGB 10.6*  --   --   --  12.1   MCV 84  --   --   --  86     --   --   --  280     --   --   --  128*   POTASSIUM 4.2  --   --   --  4.6   CHLORIDE 101  --   --   --  91*   CO2 28  --   --   --  27   BUN 29  --   --   --  42*   CR 0.80  --   --   --  1.04   ANIONGAP 6  --   --   --  10   WESLEY 8.7  --   --   --  10.2*   *  --   --   --  715*   ALBUMIN  --   --   --   --  2.8*   PROTTOTAL  --   --   --   --  7.8   BILITOTAL  --   --   --   --  0.5   ALKPHOS  --   --   --   --  154*   ALT  --   --   --   --  36   AST  --   --   --   --  9   LIPASE  --   --   --   --  57*   TROPI  --  <0.015 <0.015  --  <0.015   TROPONIN  --   --   --  0.01  --        Recent Results (from the past 24 hour(s))   XR Chest 2 Views    Narrative    XR CHEST 2 VW   11/13/2018 3:01 PM     HISTORY: chest pain;     COMPARISON: None.    FINDINGS: Patient is taking a shallow inspiration. There is a small  amount of platelike atelectasis at the left lung base.  Lungs are  otherwise clear. The pulmonary vasculature is normal.  The bones and  soft tissues are unremarkable.      Impression    IMPRESSION: Platelike atelectasis left lung base. The lungs are  otherwise clear.        IBIS INGRAM MD

## 2018-11-14 NOTE — PLAN OF CARE
Problem: Patient Care Overview  Goal: Plan of Care/Patient Progress Review  OT: Attempted to see pt for a cognitive eval, pt was open to participating but was unable to stay alert during session. Pt fell asleep multiple times mid word or sentence. Pt was polite and apologetic, but unable to meaningfully participate. Pt reported she lives alone, has 5 steps in her apt building, manages her own medications, has a licence to drive but does not have a car. Began SLUMS which had to be discontinued. Pt was oriented to day/year/place

## 2018-11-15 ENCOUNTER — APPOINTMENT (OUTPATIENT)
Dept: SPEECH THERAPY | Facility: CLINIC | Age: 63
End: 2018-11-15
Attending: PHYSICIAN ASSISTANT
Payer: MEDICARE

## 2018-11-15 VITALS
DIASTOLIC BLOOD PRESSURE: 59 MMHG | OXYGEN SATURATION: 96 % | WEIGHT: 184.7 LBS | BODY MASS INDEX: 41.55 KG/M2 | HEART RATE: 71 BPM | RESPIRATION RATE: 18 BRPM | SYSTOLIC BLOOD PRESSURE: 140 MMHG | HEIGHT: 56 IN | TEMPERATURE: 97.9 F

## 2018-11-15 LAB
GLUCOSE BLDC GLUCOMTR-MCNC: 138 MG/DL (ref 70–99)
GLUCOSE BLDC GLUCOMTR-MCNC: 142 MG/DL (ref 70–99)
GLUCOSE BLDC GLUCOMTR-MCNC: 184 MG/DL (ref 70–99)
GLUCOSE BLDC GLUCOMTR-MCNC: 72 MG/DL (ref 70–99)

## 2018-11-15 PROCEDURE — 92610 EVALUATE SWALLOWING FUNCTION: CPT | Mod: GN

## 2018-11-15 PROCEDURE — 99207 ZZC CDG-CODE CATEGORY CHANGED: CPT | Performed by: PHYSICIAN ASSISTANT

## 2018-11-15 PROCEDURE — 25000132 ZZH RX MED GY IP 250 OP 250 PS 637: Mod: GY | Performed by: PHYSICIAN ASSISTANT

## 2018-11-15 PROCEDURE — 40000225 ZZH STATISTIC SLP WARD VISIT

## 2018-11-15 PROCEDURE — 25000132 ZZH RX MED GY IP 250 OP 250 PS 637: Mod: GY | Performed by: INTERNAL MEDICINE

## 2018-11-15 PROCEDURE — 99203 OFFICE O/P NEW LOW 30 MIN: CPT | Performed by: PSYCHIATRY & NEUROLOGY

## 2018-11-15 PROCEDURE — 96372 THER/PROPH/DIAG INJ SC/IM: CPT

## 2018-11-15 PROCEDURE — A9270 NON-COVERED ITEM OR SERVICE: HCPCS | Mod: GY | Performed by: PHYSICIAN ASSISTANT

## 2018-11-15 PROCEDURE — A9270 NON-COVERED ITEM OR SERVICE: HCPCS | Mod: GY | Performed by: INTERNAL MEDICINE

## 2018-11-15 PROCEDURE — G0378 HOSPITAL OBSERVATION PER HR: HCPCS

## 2018-11-15 PROCEDURE — 99217 ZZC OBSERVATION CARE DISCHARGE: CPT | Performed by: PHYSICIAN ASSISTANT

## 2018-11-15 PROCEDURE — 25000131 ZZH RX MED GY IP 250 OP 636 PS 637: Mod: GY | Performed by: PHYSICIAN ASSISTANT

## 2018-11-15 PROCEDURE — 00000146 ZZHCL STATISTIC GLUCOSE BY METER IP

## 2018-11-15 PROCEDURE — 92526 ORAL FUNCTION THERAPY: CPT | Mod: GN

## 2018-11-15 RX ORDER — ESCITALOPRAM OXALATE 10 MG/1
10 TABLET ORAL DAILY
Status: DISCONTINUED | OUTPATIENT
Start: 2018-11-15 | End: 2018-11-15 | Stop reason: HOSPADM

## 2018-11-15 RX ORDER — ESCITALOPRAM OXALATE 10 MG/1
10 TABLET ORAL DAILY
Qty: 30 TABLET | Refills: 0 | Status: SHIPPED | OUTPATIENT
Start: 2018-11-15

## 2018-11-15 RX ORDER — OMEPRAZOLE 40 MG/1
40 CAPSULE, DELAYED RELEASE ORAL DAILY
Qty: 30 CAPSULE | Refills: 0 | Status: SHIPPED | OUTPATIENT
Start: 2018-11-16

## 2018-11-15 RX ORDER — NORTRIPTYLINE HCL 10 MG
10 CAPSULE ORAL AT BEDTIME
Qty: 30 CAPSULE | Refills: 0 | Status: SHIPPED | OUTPATIENT
Start: 2018-11-15

## 2018-11-15 RX ADMIN — ASPIRIN 81 MG: 81 TABLET, COATED ORAL at 07:51

## 2018-11-15 RX ADMIN — METFORMIN HYDROCHLORIDE 1500 MG: 500 TABLET, FILM COATED ORAL at 07:52

## 2018-11-15 RX ADMIN — ESCITALOPRAM OXALATE 10 MG: 10 TABLET ORAL at 10:27

## 2018-11-15 RX ADMIN — METOPROLOL SUCCINATE 100 MG: 100 TABLET, EXTENDED RELEASE ORAL at 07:51

## 2018-11-15 RX ADMIN — INSULIN GLARGINE 54 UNITS: 100 INJECTION, SOLUTION SUBCUTANEOUS at 07:52

## 2018-11-15 RX ADMIN — HYDROCHLOROTHIAZIDE 25 MG: 25 TABLET ORAL at 07:52

## 2018-11-15 RX ADMIN — INSULIN ASPART 10 UNITS: 100 INJECTION, SOLUTION INTRAVENOUS; SUBCUTANEOUS at 07:53

## 2018-11-15 RX ADMIN — OMEPRAZOLE 40 MG: 20 CAPSULE, DELAYED RELEASE ORAL at 07:52

## 2018-11-15 RX ADMIN — LISINOPRIL 40 MG: 40 TABLET ORAL at 07:51

## 2018-11-15 NOTE — PROGRESS NOTES
Obs goals:  -diagnostic tests and consults completed and resulted: met  -vital signs normal or at patient baseline: MET  -tolerating oral intake to maintain hydration: MET  -returns to baseline functional status: MET, independent in room  -safe disposition plan has been identified met  Nurse to notify provider when observation goals have been met and patient is ready for discharge.    A&Ox4, VSS on RA. Denies pain, c/o soreness due to bed angle. Up ad brianna. Mod carb diet, voiding adequately. Last blood sugar 72 before lunch. Psych seen, speech cleared, SW following for discharge.  IV SL. Plan to DC today with home care services at approx 7pm. Will continue to monitor.

## 2018-11-15 NOTE — PROGRESS NOTES
Obs goals:  -diagnostic tests and consults completed and resulted: met  -vital signs normal or at patient baseline: MET  -tolerating oral intake to maintain hydration: MET  -returns to baseline functional status: MET, independent in room  -safe disposition plan has been identified IN PROGRESS  Nurse to notify provider when observation goals have been met and patient is ready for discharge.

## 2018-11-15 NOTE — PLAN OF CARE
Problem: Patient Care Overview  Goal: Plan of Care/Patient Progress Review  Discharge Planner SLP   Patient plan for discharge: Home today   Current status: Pt seen for bedside swallow evaluation as she reported to NP that she was not taking her medication at home for about 3 weeks d/t difficulty swallowing. NP also noticed she coughed frequently when eating a meal yesterday. Pt was assessed at bedside with her breakfast tray. Oral musculature within functional limits. Partial natural dentition, pt reports choosing softer foods at baseline. Pt was assessed with thin liquids, semi soft solids and regular solids. Pt was able to masticate all textures functionally, regular solids did require some extra time however pt was able to clear her oral cavity without difficulty. No overt s/sx of aspiration immediately following intake, however delayed coughing noted in approx 50% of thin liquid trials when consumed by sttraw sips. This was reduced to almost no coughing when taking cup sips. Pt also intermittently reports foods sticking in her chest during evaluation. She did not present with any changes in breath sounds or vocal quality. Single swallows per bolus.     Recommend pt consume regular solids and thin liquids using safe swallow strategies and strictly follow reflux precuations. PPI has been initiated per NP report, she also would benefit from a GI consult further evaluation of her esophagus is desired.     Barriers to return to prior living situation: None per SLP  Recommendations for discharge: No further SLP services recommended, GI follow up if desired  Rationale for recommendations: Pt tolerating regular diet from an oropharyngeal perspective, suspect primarily esophageal dysfunction     Speech Language Therapy Discharge Summary    Reason for therapy discharge:    All goals and outcomes met, no further needs identified.    Progress towards therapy goal(s). See goals on Care Plan in [facility] electronic health record  for goal details.  Goals met    Therapy recommendation(s):    No further therapy is recommended.           Entered by: Lyudmila Feldman 11/15/2018 9:24 AM

## 2018-11-15 NOTE — PROGRESS NOTES
Obs goals:  -diagnostic tests and consults completed and resulted: not met, psych to see in morning  -vital signs normal or at patient baseline: MET  -tolerating oral intake to maintain hydration: MET  -returns to baseline functional status: MET, independent in room  -safe disposition plan has been identified IN PROGRESS  Nurse to notify provider when observation goals have been met and patient is ready for discharge.

## 2018-11-15 NOTE — PROGRESS NOTES
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    Clinical Swallow Evaluation   Oral Musculature generally intact   Structural Abnormalities none present   Dentition present and adequate  (partial natural dentition)   Mucosal Quality good   Oral Labial Strength and Mobility WFL   Lingual Strength and Mobility WFL   Laryngeal Function Cough;Swallow;Voicing initiated   Additional Documentation Yes   Swallow Eval   Feeding Assistance no assistance needed   Clinical Swallow Eval: Thin Liquid Texture Trial   Mode of Presentation, Thin Liquids cup;straw;self-fed   Volume of Liquid or Food Presented 4 oz   Oral Phase of Swallow WFL   Pharyngeal Phase of Swallow intact   Diagnostic Statement No overt s/sx of aspiration directly following thin liquid intake   Clinical Swallow Eval: Semisolid Texture Trial   Mode of Presentation, Semisolid self-fed   Volume of Semisolid Food Presented 1/2 omlete   Oral Phase, Semisolid WFL   Pharyngeal Phase, Semisolid intact   Diagnostic Statement No s/sx of aspiration    Clinical Swallow Eval: Solid Food Texture Trial   Mode of Presentation, Solid self-fed   Volume of Solid Food Presented danitza    Oral Phase, Solid WFL   Pharyngeal Phase, Solid intact   Diagnostic Statement Adequate oral manipulation. NO s/sx of aspiration, admits to some heart burn    Swallow Compensations   Swallow Compensations Alternate viscosity of consistencies;Pacing;Reduce amounts;Multiple swallow   Results No difficulties noted   Esophageal Phase of Swallow   Patient reports or presents with symptoms of esophageal dysphagia Yes   Esophageal comments Pt reports intermittent heart burn, she denies any difficulty with pills. She also admits to intermittent burping    General Therapy Interventions   Planned Therapy Interventions Dysphagia Treatment   Dysphagia treatment Instruction of safe swallow strategies   Swallow Eval: Clinical Impressions   Skilled Criteria for Therapy Intervention Skilled criteria met.  Treatment indicated.   Functional Assessment Scale (FAS) 6   Treatment Diagnosis Suspect esophageal dysphagia/reflux    Diet texture recommendations Regular diet;Thin liquids   Recommended Feeding/Eating Techniques alternate between small bites and sips of food/liquid;maintain upright posture during/after eating for 30 mins;no straws;small sips/bites   Therapy Frequency 5 times/wk   Predicted Duration of Therapy Intervention (days/wks) 1 day    Anticipated Discharge Disposition home   Risks and Benefits of Treatment have been explained. Yes   Patient, family and/or staff in agreement with Plan of Care Yes   Clinical Impression Comments Pt seen for bedside swallow evaluation as she reported to NP that she was not taking her medication at home for about 3 weeks d/t difficulty swallowing. NP also noticed she coughed frequently when eating a meal yesterday. Pt was assessed at bedside with her breakfast tray. Oral musculature within functional limits. Partial natural dentition, pt reports choosing softer foods at baseline. Pt was assessed with thin liquids, semi soft solids and regular solids. Pt was able to masticate all textures  functionally, regular solids did require some extra time however pt was able to clear her oral cavity without difficulty. No overt s/sx of aspiration immediately following intake, however delayed coughing noted in approx 50% of thin liquid trials when consumed by sttraw sips. This was reduced to almost no coughing when taking cup sips. Pt also intermittently reports foods sticking in her chest during evaluation. She did not present with any changes in breath sounds or vocal quality. Single swallows per bolus. Recommend pt consume regular solids and thin liquids using safe swallow strategies and strictly follow reflux precuations. PPI has been initiated per NP report, she also would benefit from a GI consult further evaluation of her esophagus is desired.    Total Evaluation Time   Total Evaluation Time (Minutes) 18

## 2018-11-15 NOTE — PROGRESS NOTES
Patient A&0x4. Her affect is flat she admits thoughts of suicide by overdose at times but denies any current thoughts of SI or SIB at this time. Psych to see. She is up independently. She is tolerating PO intake. Lungs clear, On RA. No edema. Denies pain

## 2018-11-15 NOTE — PLAN OF CARE
Problem: Patient Care Overview  Goal: Plan of Care/Patient Progress Review  Outcome: No Change   Observation goals PRIOR TO DISCHARGE     Comments: -diagnostic tests and consults completed and resulted: not met, needs to see psych   -vital signs normal or at patient baseline: met   -tolerating oral intake to maintain hydration: met   -returns to baseline functional status: met   -safe disposition plan has been identified   Nurse to notify provider when observation goals have been met and patient is ready for discharge.

## 2018-11-15 NOTE — DISCHARGE SUMMARY
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 Patient reports that she has not taken any of her home medications for the past three weeks or so due to difficulty swallowing pills, nausea, and vomiting after \"bad fish.\" She was seen by her therapist the day of admission who felt her behavior was strange and found her blood sugar to be very elevated prompting EMS activation. Therapist called PCP office with concerns of patient's ability to care for herself. On arrival her blood sugar was 700. She was subsequently admitted to observation for ongoing management. Please see admission history and physical by Dr. Mariscal for additional information.     On day of discharge patient is doing much better. Mental status is improved. She is not having any difficulty with swallowing and is tolerating po. Denies chest pain, shortness of breath, abdominal pain, nausea.     Hospital Course   Kadi Leung was admitted on 11/13/2018.  The following problems were addressed during her hospitalization:    Hyperglycemia in the setting of medication non-compliance  Type 2 diabetes mellitus, insulin dependent, with retinopathy and neuropathy  Presents from therapist office after blood sugar was too high to read. Reports she has not taken her medications in 3 weeks because she had difficulty " swallowing. Initial . A1C is 8.1. Per [facility] she is supposed to be on metformin, Trulicity, Novolog 10u tid and lantus daily. Per chart review therapist concerned about patient's ability to care for herself. Blood sugars improved through admission. Stable at time of discharge. She was advised to not miss meals at home and monitor her sugars closely.   --home dose resumed morning of discharge. Patient states she has supply at home  -- reduce Novolog to 5 units tid with meals until she is eating regularly on a stable basis at home- can discuss with PCP in follow up  --holding trulicity, resume at discharge  --resume metformin at reduced dose as she has been off this medication for three weeks; will restart at 1000mg in the am and 500mg with dinnner, approximately half dose per pharmacy recommendation. She will continue this for 2 weeks and then resume normal dosing. Home care to assist.   --start PPI to help with GERD, patient feels this has been helpful.   -- SLP evaluated patient due to reported dysphagia; recommend reflux precautions. She will see PCP in follow up and discuss GI referral for EGD if symptoms reoccur      Chest pain. Difficult historian. Reported some chest pain to the ED, now denying any pain.  EKG without acute ischemic changes. Troponin negative x3. Denies recurrence of pain. Patient felt this may be GI in origin.      Hypertension. On lisinopril, metoprolol, and hydrochlorothiazide. Hydrochlorothiazide was increased to 1.5 tablets in October per PCP note. Await formal med rec. Unclear whether patient is being compliant with meds.   --continue PTA meds at discharge     History of depression with psychosis. Some question of suicidal ideation per admitting provider note.   --psych consult, ok to discharge with close PCP follow up  --resume Lexapro and nortriptyline at reduced doses due to non-compliance for 3 weeks. She will continue Wellbutrin at current dose.   -- follow up  psychiatry 11/20 as scheduled  --case managed called by social work to ask her to follow up with patient an ensure she has a ride to necessary appointments next week     HLD. Hold statin due to observation status, resume at discharge    This patient was seen and examined with Dr. Dickey who agrees with the above plan.    Zohreh Giron PA-C    Significant Results and Procedures   See below     Code Status   Full Code       Primary Care Physician   [facility]    Physical Exam   Temp: 99.1  F (37.3  C) Temp src: Oral BP: 148/56 Pulse: 74   Resp: 18 SpO2: 92 % O2 Device: None (Room air)    Vitals:    11/13/18 1401 11/13/18 1630 11/14/18 0500   Weight: 81.6 kg (180 lb) 82.2 kg (181 lb 3.2 oz) 83.8 kg (184 lb 11.2 oz)     Vital Signs with Ranges  Temp:  [98.2  F (36.8  C)-99.1  F (37.3  C)] 99.1  F (37.3  C)  Pulse:  [72-80] 74  Resp:  [16-18] 18  BP: ()/(41-66) 148/56  SpO2:  [92 %-96 %] 92 %       Constitutional: Alert and oriented, sitting up in bed. Appears comfortable and is appropriately conversant. Mental status significantly improved from yesterday.  ENT: normal cephalic, moist mucous membranes  Respiratory: Lungs clear to auscultation bilaterally, no increased work of breathing or wheezing  Cardiovascular: Regular rate and rhythm, no murmur, no significant LE edema  GI:  active bowel sounds, abdomen soft, non-tender  Skin/Integumen: warm, dry  MSK:  Moves all four extremities  Neuro:  Cranial nerves 2-12 grossly intact. No focal deficits.       Discharge Disposition   Discharged to home  Condition at discharge: Stable    Consultations This Hospital Stay   PSYCHIATRY IP CONSULT  SOCIAL WORK IP CONSULT  PHYSICAL THERAPY ADULT IP CONSULT  OCCUPATIONAL THERAPY ADULT IP CONSULT  SPEECH LANGUAGE PATH ADULT IP CONSULT  CARE TRANSITION RN/SW IP CONSULT    Time Spent on this Encounter   I, Zohreh Giron, personally saw the patient today and spent greater than 30 minutes discharging this  patient.    Discharge Orders     Home care nursing referral     Reason for your hospital stay   You were here for evaluation of high blood sugars and confusion     Follow-up and recommended labs and tests    Follow up with your Psychiatrist on 11/20 as scheduled.   Discuss whether to go back to normal doses of Nortriptyline and Lexapro    You should follow up with your primary care provider (Dr Galicia) within 7 days for hospital follow up.   Appointment has been scheduled.  Discuss issues with swallowing and acid reflux symptoms and possible GI referral.  Discuss current metformin dose and plan to increase.     Activity   Your activity upon discharge: activity as tolerated     Discharge Instructions   We have made some changes in your medications. Because you have been off of these for several weeks we need to adjust some of your doses.   tonight (11/15) when you get home do not take any of your evening pills (nortryptaline or metformin).  A nurse will be out to your home tomorrow morning to help change out the pills in your pillbox and make sure all your new doses are correct. DO NOT TAKE your pills tomorrow morning 11/16 until the nurse comes out and helps. You can take your insulin as you normally do.     Changes:  Reduce metformin to 1000mg in the morning and 500mg in the evening. You will do this for 2 weeks and then resume the dose you have previously been on (1500mg in the morning, 100mg with dinner)    Reduce your Nortryptyline that you take at bedtime to 10mg (it was 50mg before). Ask your psychiatry when to increase your dose back to normal when you see them in clinic.   Reduce you Lexapro to 10mg daily; talk with your psychiatrist on 11/20 about when you should increase this.     Make sure to continue taking your insulin as directed. Check your blood sugars three times daily before meals and at bedtime.     MD face to face encounter   Documentation of Face to Face and Certification for Home Health  Services    I certify that patient: Kadi Leung is under my care and that I, or a nurse practitioner or physician's assistant working with me, had a face-to-face encounter that meets the physician face-to-face encounter requirements with this patient on: 11/15/2018.    This encounter with the patient was in whole, or in part, for the following medical condition, which is the primary reason for home health care: type 2 diabetes with hyperglycemia. Depression. Hypertension. Cognitive impairment.     I certify that, based on my findings, the following services are medically necessary home health services: Nursing.    My clinical findings support the need for the above services because: Nurse is needed: To provide assessment and oversight required in the home to assure adherence to the medical plan and assist with recent medication changes. Help with blood sugar monitoring. Patient has been off all medications for 3 weeks so several need to be adjusted and re-titrated up. Please make sure she is making the correct changes in her medications.     Further, I certify that my clinical findings support that this patient is homebound (i.e. absences from home require considerable and taxing effort and are for medical reasons or [employer] services or infrequently or of short duration when for other reasons) because:  Requires assistance of another person or specialized equipment to access medical services because patient: patient does not drive. Cognitive impairment.     Based on the above findings. I certify that this patient is confined to the home and needs intermittent skilled nursing care, physical therapy and/or speech therapy.  The patient is under my care, and I have initiated the establishment of the plan of care.  This patient will be followed by a physician who will periodically review the plan of care.  Physician/Provider to provide follow up care: [facility] physician (the  Medicare certified [facility] provider): Dr. Kraig Dickey  Physician Signature: See electronic signature associated with these discharge orders.  Date: 11/15/2018     Follow-up and recommended labs and tests    Follow up Appointment Dr Galicia (Primary Care Doctor)  Monday Nov 19.  2:40 pm  [address] [facility] [place]  [phone]     Full Code     Diet   Follow this diet upon discharge: Orders Placed This Encounter     Moderate Consistent CHO Diet       Discharge Medications   Current Discharge Medication List      START taking these medications    Details   omeprazole (PRILOSEC) 40 MG capsule Take 1 capsule (40 mg) by mouth daily  Qty: 30 capsule, Refills: 0    Associated Diagnoses: Gastroesophageal reflux disease, esophagitis presence not specified         CONTINUE these medications which have CHANGED    Details   escitalopram (LEXAPRO) 10 MG tablet Take 1 tablet (10 mg) by mouth daily  Qty: 30 tablet, Refills: 0    Associated Diagnoses: Hyperglycemia      !! metFORMIN (GLUCOPHAGE) 1000 MG tablet Take 1 tablet (1,000 mg) by mouth daily (with breakfast)  Qty: 60 tablet      !! metFORMIN (GLUCOPHAGE) 500 MG tablet Take 1 tablet (500 mg) by mouth daily (with dinner)  Qty: 14 tablet, Refills: 0    Associated Diagnoses: Type 2 diabetes mellitus with complication, with long-term current use of insulin (H)      nortriptyline (PAMELOR) 10 MG capsule Take 1 capsule (10 mg) by mouth At Bedtime  Qty: 30 capsule, Refills: 0    Associated Diagnoses: Major depressive disorder, remission status unspecified, unspecified whether recurrent       !! - Potential duplicate medications found. Please discuss with provider.      CONTINUE these medications which have NOT CHANGED    Details   aspirin 81 MG EC tablet Take 81 mg by mouth daily      atorvastatin (LIPITOR) 40 MG tablet Take 40 mg by mouth daily      BASAGLAR 100 UNIT/ML injection Inject 54 Units Subcutaneous daily      buPROPion (WELLBUTRIN XL) 150 MG 24 hr tablet Take 150  mg by mouth every morning      cholecalciferol (VITAMIN D3) 5000 units (125 mcg) CAPS capsule Take 5,000 Units by mouth daily      dulaglutide (TRULICITY) 0.75 MG/0.5ML pen Inject 0.75 mg Subcutaneous every 7 days sundays      ferrous sulfate (IRON) 325 (65 Fe) MG tablet Take 325 mg by mouth 2 times daily      hydrochlorothiazide (HYDRODIURIL) 25 MG tablet Take 25 mg by mouth daily      insulin aspart (NOVOLOG FLEXPEN) 100 UNIT/ML injection Inject 10 Units Subcutaneous 3 times daily (with meals)      lisinopril (PRINIVIL/ZESTRIL) 40 MG tablet Take 40 mg by mouth daily      metoprolol succinate (TOPROL-XL) 100 MG 24 hr tablet Take 100 mg by mouth daily           Allergies   No Known Allergies  Data   Most Recent 3 CBC's:  Recent Labs   Lab Test  11/14/18   0630  11/13/18   1408   WBC  8.0  11.1*   HGB  10.6*  12.1   MCV  84  86   PLT  265  280      Most Recent 3 BMP's:  Recent Labs   Lab Test  11/14/18   0630  11/13/18   1408   NA  135  128*   POTASSIUM  4.2  4.6   CHLORIDE  101  91*   CO2  28  27   BUN  29  42*   CR  0.80  1.04   ANIONGAP  6  10   WESLEY  8.7  10.2*   GLC  298*  715*     Most Recent 2 LFT's:  Recent Labs   Lab Test  11/13/18   1408   AST  9   ALT  36   ALKPHOS  154*   BILITOTAL  0.5     Most Recent INR's and Anticoagulation Dosing History:  Anticoagulation Dose History     There is no flowsheet data to display.        Most Recent 3 Troponin's:  Recent Labs   Lab Test  11/13/18   2158  11/13/18   1802  11/13/18   1424  11/13/18   1408   TROPI  <0.015  <0.015   --   <0.015   TROPONIN   --    --   0.01   --      Most Recent Cholesterol Panel:No lab results found.  Most Recent 6 Bacteria Isolates From Any Culture (See EPIC Reports for Culture Details):No lab results found.  Most Recent TSH, T4 and A1c Labs:  Recent Labs   Lab Test  11/13/18   1802   A1C  8.1*     Results for orders placed or performed during the hospital encounter of 11/13/18   XR Chest 2 Views    Narrative    XR CHEST 2 VW   11/13/2018  3:01 PM     HISTORY: chest pain;     COMPARISON: None.    FINDINGS: Patient is taking a shallow inspiration. There is a small  amount of platelike atelectasis at the left lung base.  Lungs are  otherwise clear. The pulmonary vasculature is normal.  The bones and  soft tissues are unremarkable.      Impression    IMPRESSION: Platelike atelectasis left lung base. The lungs are  otherwise clear.        IBIS INGRAM MD

## 2018-11-15 NOTE — PROGRESS NOTES
PRIOR TO DISCHARGE     Comments: -diagnostic tests and consults completed and resulted - in progress  -vital signs normal or at patient baseline - in progress  -tolerating oral intake to maintain hydration - complete  -returns to baseline functional status - complete  -safe disposition plan has been identified - in progress  Nurse to notify provider when observation goals have been met and patient is ready for discharge

## 2018-11-15 NOTE — PLAN OF CARE
Problem: Patient Care Overview  Goal: Plan of Care/Patient Progress Review  Outcome: Improving  [id number]: Patient is A&O, VSS on RA. Denies pain. Up independently. Glucose 197 at HS, no coverage needed. IV SL. Discharge pending psych to see in AM.

## 2018-11-15 NOTE — PROGRESS NOTES
SW: Patient requested assistance in arranging transport. Ride scheduled for 1330 via [facility]. Patient informed and confirmed that she is able to navigate the steps into her building and down to her apt.    SW has identified no other social service needs at this time. SW will remain available should other needs arise.    ADDENDUM: SKYLAR was requested to push patient transport time back a couple of hours. Transport reschedule time is 1900 and on wait list for earlier ride if becomes available.    Per Hospitalist request, SKYLAR contacted patient's [facility] : Payton Radford @ [phone] and left message, informing her that a Psychiatry appt has been scheduled for next week, and requested that she follow up with patient to assure she gets to appt and assisting patient with scheduling transport.    SKYLAR has identified no other social service needs at this time. SW will remain available should other needs arise.

## 2018-11-15 NOTE — CONSULTS
Care Transition Initial Assessment - RN        Met with: Patient.  DATA   Active Problems:    Hyperglycemia     Cognitive Status: alert.     Contact information and PCP information verified: YesLives With: alone  Living Arrangements: apartment      Insurance concerns: No Insurance issues identified  ASSESSMENT  Patient currently receives the following services:  Patient has [facility] : Paytonotto Radford @ [phone]. Patient uses [employer] for transport needs to appointments. Otherwise she is independent with ADLs. Lives in her own apartment       Identified issues/concerns regarding health management: patient lives alone. He agrees to home RN visit to assist with medication set up. She would also like someone to assist with light housekeeping duties once/week.    PLAN  Financial costs for the patient include none .  Patient given options and choices for discharge yes. Given [facility] choices- patient requests [facility] .  Patient/family is agreeable to the plan?  Yes:   Patient anticipates discharging to home.      Patient anticipates needs for home equipment: No  Plan/Disposition: Home w HHC/HHA  Appointments: PCP and Psych appointments have been scheduled        1100: Given information on HHA services. Referral sent to [facility]. Writer has communicated with [facility] re: seeing patient tomorrow morning ( as directed by PA) to assist with medication set up.  VM left with Payton ([facility] ) re hospitalization/discharge.  PCP appointment scheduled. Psych appointment previously scheduled  SW arranging transport home this afternoon

## 2018-11-15 NOTE — PLAN OF CARE
Problem: Patient Care Overview  Goal: Plan of Care/Patient Progress Review  Outcome: Improving  A&Ox4. VSS. RA. Forgetful. Denies pain. Mod carb diet. Blood sugar at 0200 138. Independent in room. Bowel sounds active. Lungs clear. Tele NSR. Plan for psych consult in morning.    Obs goals:  diagnostic tests and consults completed and resulted: NOT MET, psych consult in morning  vital signs normal or at patient baseline: MET  -tolerating oral intake to maintain hydration: MET  -returns to baseline functional status: MET, independent in room  -safe disposition plan has been identified IN PROGRESS  Nurse to notify provider when observation goals have been met and patient is ready for discharge.

## 2018-11-15 NOTE — CONSULTS
"Consult Date:  11/15/2018      PSYCHIATRIC CONSULTATION       REASON FOR REFERRAL:  Suicidal ideation.      REFERRING PROVIDER:   Gagandeep Blanco MD.      HISTORY OF PRESENT ILLNESS:  Ms. Kadi Leung is a very pleasant 63-year-old, twice- mother of 1, and grandmother of 3, on disability due to mental health concerns.  She was previously a  at a pharmacy, who resides alone in [place], [place].  She has a history of depression, anxiety, and no prior psychiatric admissions or prior suicide attempts.  She has a medical history of type 2 diabetes mellitus, retinopathy and neuropathy.  She was admitted to [facility] with hyperglycemia with blood sugars above 700 at the time of admission.  She was voicing possible suicidal ideation and depression symptoms; and thus, Psychiatry was consulted.  I spoke to the care provider who indicates yesterday she was acting a bit odd and somewhat confused, giving contradictory history of the events that led to admission.  To the best of our knowledge, she became ill somewhat recently, around 2-3 weeks ago, reporting that she had food poisoning and some nausea and vomiting.  She describes that she stopped her medications including her insulin at that time.  She reports that she is a \"stubborn person;\" and thus, did not seek help initially.  She apparently presented to her therapy office to have a visit with her therapist who directed her to the Emergency Department.      This morning, she is much clearer and coherent.  She is alert and oriented in all 4 spheres.  She no longer appears delirious.  She reports in a clear manner how she started feeling more ill 2 to 3 weeks ago.  She acknowledged that she has had some depression although minimizes the depression intensity recently.  She does have anxiety \"on and off.\"  She notes that she sleeps a bit too much.  She has had low motivation but at this point denies any hopelessness.  She " "does report that she has hope that she can get better.  She reports that she cares a great deal about her 3 grandchildren, as well as her parents.  She states that she does have passive suicidal thoughts that are fleeting at times intermittently but denies that she would ever act on these thoughts.  She states that, \"I am too scared.\"  Again, she then clarifies that her family is too important to her.  She denies any history of psychotic symptoms including auditory or visual hallucinations or any paranoid ideation.  She denies any history of manic symptomatology.      She reports that her medications are effective including her nortriptyline, which helps her neuropathy and Wellbutrin and Lexapro combination that she feels is helpful for depression and anxiety.  She reports that she has been off Abilify for several years and does not want to continue this medication, although it was listed prior to admission as a possible medication.      PAST PSYCHIATRIC HISTORY:  She follows with Dr. Montano in [place], [place].  She has a therapist named Maame who she sees in [place].  She does not know the clinics at this time.  She has a  named Nicolle Ken from [facility].  She has never been psychiatrically hospitalized.  No prior suicide attempts.      CHEMICAL DEPENDENCY HISTORY:  Denies.      PAST MEDICAL HISTORY:  Type 2 diabetes mellitus, retinopathy, neuropathy.      PRIOR TO ADMISSION MEDICATIONS:  She apparently was not taking medications prior to admission,    although was prescribed Wellbutrin 150 mg, Lexapro 20 mg, and nortriptyline 50 mg at bedtime for mental health.  She denies that she was taking Abilify.      ALLERGIES:  NO KNOWN ALLERGIES.        SOCIAL HISTORY:  She resides in [place] alone.  She is on disability, which she has been on since 2007 due to mental health.  She used to work as a  in a pharmacy.  She has been  twice.  She has 1 adult son who has had some " addiction problems and is in treatment.  She has 3 grandchildren that she cares deeply about.  Two live in [place], and one lives in the [facility].  She denies any alcohol, drug, nicotine use or any history of abuse of above.      FAMILY HISTORY:  A son with addiction.      REVIEW OF SYSTEMS:  Ten-point review of systems completed by Dr. Santana today.  Negative other than described in HPI.      PHYSICAL EXAMINATION:   VITAL SIGNS:  Temperature 99.1, pulse 74, blood pressure 148/56, SpO2 92% on room air, respiratory rate 18.      MENTAL STATUS EXAMINATION:  She is dressed in hospital gown, lying down in bed.  She is a very pleasant and cooperative.  She is engaged in psychiatric evaluation.  Kinetics are calm.  No involuntary movements.  No agitation.  Speech is nonpressured with slight accent but normal volume, tone prosody.  Affect is reactive.  Mood is described as better since she has been feeling physically better, rated 10/10 with 10 being normal.  She is not demonstrating any thought disorganization.  She is linear and logical without flight of ideas.  Denies perceptual disturbances and is not responding to internal stimuli.  There is no paranoid ideation.  Cognition appears clear and coherent at this time.  She is able to recite the days of the week in reverse easily.  She is fully oriented in all 4 spheres.  Short-term memory appears intact.  She is able to recall recent events accurately.  Long-term memory appears intact.  She is able recall remote events.  Insight appears good, as she acknowledges psychiatric and symptoms and need for ongoing care with her therapist and outpatient psychiatrist.  She also acknowledges need for more support from home health nursing.  Judgment is improving that she is collaborative and cooperative with the treatment team and willing to continue following up with outpatient providers.      ASSESSMENT:   1.  Major depressive disorder, moderate, without psychotic features,  recurrent.   2.  Unspecified anxiety disorder.   3.  Diabetes mellitus type 2.      FORMULATION:  This is a very pleasant 63-year-old woman with history of depression, anxiety and diabetes type 2, who presented essentially with failure to thrive with severe hyperglycemia after going to her outpatient therapy appointment and being directed to the Emergency Department.  Her blood sugars have been stabilized nicely here in the observation unit, and her cognition has cleared substantially over the last few days.  Based on the clinical history, it sounds like she likely had delirium that cleared with the correction of her severe blood sugar elevation.  Other lab work was mostly unremarkable other than a low sodium at 128, which has since been corrected.  She is voicing good management with her depression with outpatient providers in general and finds that her medication regimen is helpful.  She does not find any need for inpatient psychiatry at this time.  She is not holdable as she is denying any current suicidal thoughts, intent or planning.  Thoughts has only been passive in the past.  She is willing to escalate support with home health nursing and continue to follow up with her , outpatient psychiatrist and therapist.  Thus, she is not demonstrating immediate danger if she is discharged at this time.  She denied thoughts of harming others.  No history of violent behavior; thus acute risk of violence to others assessed to be low.  Denies access to firearms. Available labs were reveiwed     PLAN:   1.  The patient can be discharged once medically cleared.   2.  If the patient has not been taking her medications in several weeks, then I would recommend starting doses again, and then she can follow up with her psychiatrist for further up titration.  Could start Wellbutrin 150 XL daily, Lexapro 10 mg daily and nortriptyline 10 mg at bedtime initially.  If it can be clarified that she was indeed adherent with  medications prior to admission, then I would be comfortable with her continuing the previous to admission doses.  Note that she is reporting that she has not been taking Abilify in several years, so that can be discontinued.   3.  Would recommend that social work collaborate with her outpatient  to ensure that they are aware of the situation and can help her get through outpatient psychiatry appointment on .  The patient states that she needs to call for a ride and is not sure if she is too late to call for it at this time.  The  should be able to ensure that she gets that appointment.         MARJAN HENDERSON MD             D: 11/15/2018   T: 11/15/2018   MT:       Name:     PEDRO BA   MRN:      [id number]        Account:       [id number]   :      1955           Consult Date:  11/15/2018      Document: [id number]

## 2018-11-16 NOTE — PLAN OF CARE
Problem: Patient Care Overview  Goal: Plan of Care/Patient Progress Review  Outcome: Adequate for Discharge Date Met: 11/15/18  Alert and oriented X 4, forgetful. Up ad brianna. Denies pain.  at dinner, ate 75%. Novolog 5 units given per order change. HE ride set up for 1900. Writer reviewed all discharge instructions extensively with pt, including new insulin dose, changes in oral medications, follow up appointments, and home RN referral. Re-iterated importance of checking BG, setting up rides for follow up appts. Pt acknowledged understanding of all orders. All questions answered. Pt to discharge via [facility] transport.

## 2019-03-29 NOTE — PLAN OF CARE
Called patient back and let her know. She does have an appointment set up for April 3 rd. Nancy Zepeda LPN ....................3/29/2019  10:49 AM     Problem: Patient Care Overview  Goal: Plan of Care/Patient Progress Review  Outcome: No Change  Observation goals PRIOR TO DISCHARGE     Comments: -diagnostic tests and consults completed and resulted : not met, Psych/SW/PT/OT  -vital signs normal or at patient baseline : met  -tolerating oral intake to maintain hydration : met  -returns to baseline functional status : met  -safe disposition plan has been identified :  Not met  Nurse to notify provider when observation goals have been met and patient is ready for discharge.